# Patient Record
Sex: FEMALE | Race: WHITE | NOT HISPANIC OR LATINO | Employment: FULL TIME | URBAN - METROPOLITAN AREA
[De-identification: names, ages, dates, MRNs, and addresses within clinical notes are randomized per-mention and may not be internally consistent; named-entity substitution may affect disease eponyms.]

---

## 2019-04-30 ENCOUNTER — TRANSFERRED RECORDS (OUTPATIENT)
Dept: HEALTH INFORMATION MANAGEMENT | Facility: CLINIC | Age: 34
End: 2019-04-30

## 2019-04-30 LAB
HPV ABSTRACT: NORMAL
PAP-ABSTRACT: NORMAL

## 2021-08-10 ENCOUNTER — TRANSFERRED RECORDS (OUTPATIENT)
Dept: HEALTH INFORMATION MANAGEMENT | Facility: CLINIC | Age: 36
End: 2021-08-10
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
## 2021-08-17 NOTE — PATIENT INSTRUCTIONS
Please make appointment to see the dentist.     You can call imaging scheduling to set up appointment date, time, and location that works best for you to have ultrasound of your thyroid. [phone]         Preventive Health Recommendations  Female Ages 26 - 39  Yearly exam:   See your health care provider every year in order to    Review health changes.     Discuss preventive care.      Review your medicines if you your doctor has prescribed any.    Until age 30: Get a Pap test every three years (more often if you have had an abnormal result).    After age 30: Talk to your doctor about whether you should have a Pap test every 3 years or have a Pap test with HPV screening every 5 years.   You do not need a Pap test if your uterus was removed (hysterectomy) and you have not had cancer.  You should be tested each year for STDs (sexually transmitted diseases), if you're at risk.   Talk to your provider about how often to have your cholesterol checked.  If you are at risk for diabetes, you should have a diabetes test (fasting glucose).  Shots: Get a flu shot each year. Get a tetanus shot every 10 years.   Nutrition:     Eat at least 5 servings of fruits and vegetables each day.    Eat whole-grain bread, whole-wheat pasta and brown rice instead of white grains and rice.    Get adequate Calcium and Vitamin D.     Lifestyle    Exercise at least 150 minutes a week (30 minutes a day, 5 days of the week). This will help you control your weight and prevent disease.    Limit alcohol to one drink per day.    No smoking.     Wear sunscreen to prevent skin cancer.    See your dentist every six months for an exam and cleaning.

## 2021-08-18 ENCOUNTER — OFFICE VISIT (OUTPATIENT)
Dept: FAMILY MEDICINE | Facility: CLINIC | Age: 36
End: 2021-08-18
Payer: COMMERCIAL

## 2021-08-18 VITALS
TEMPERATURE: 98.6 F | WEIGHT: 133.2 LBS | DIASTOLIC BLOOD PRESSURE: 56 MMHG | HEIGHT: 62 IN | SYSTOLIC BLOOD PRESSURE: 94 MMHG | HEART RATE: 64 BPM | RESPIRATION RATE: 16 BRPM | BODY MASS INDEX: 24.51 KG/M2

## 2021-08-18 DIAGNOSIS — R06.83 SNORING: ICD-10-CM

## 2021-08-18 DIAGNOSIS — R53.83 FATIGUE, UNSPECIFIED TYPE: ICD-10-CM

## 2021-08-18 DIAGNOSIS — F41.1 GAD (GENERALIZED ANXIETY DISORDER): ICD-10-CM

## 2021-08-18 DIAGNOSIS — Z12.4 SCREENING FOR MALIGNANT NEOPLASM OF CERVIX: ICD-10-CM

## 2021-08-18 DIAGNOSIS — Z13.1 SCREENING FOR DIABETES MELLITUS: ICD-10-CM

## 2021-08-18 DIAGNOSIS — Z11.3 SCREEN FOR STD (SEXUALLY TRANSMITTED DISEASE): ICD-10-CM

## 2021-08-18 DIAGNOSIS — F33.2 SEVERE EPISODE OF RECURRENT MAJOR DEPRESSIVE DISORDER, WITHOUT PSYCHOTIC FEATURES (H): ICD-10-CM

## 2021-08-18 DIAGNOSIS — E04.1 THYROID NODULE: ICD-10-CM

## 2021-08-18 DIAGNOSIS — Z13.220 SCREENING FOR LIPOID DISORDERS: ICD-10-CM

## 2021-08-18 DIAGNOSIS — F42.9 OBSESSIVE-COMPULSIVE DISORDER, UNSPECIFIED TYPE: ICD-10-CM

## 2021-08-18 DIAGNOSIS — Z00.00 ROUTINE GENERAL MEDICAL EXAMINATION AT A HEALTH CARE FACILITY: Primary | ICD-10-CM

## 2021-08-18 LAB
ALBUMIN SERPL-MCNC: 4.1 G/DL (ref 3.4–5)
ALP SERPL-CCNC: 43 U/L (ref 40–150)
ALT SERPL W P-5'-P-CCNC: 23 U/L (ref 0–50)
ANION GAP SERPL CALCULATED.3IONS-SCNC: 2 MMOL/L (ref 3–14)
AST SERPL W P-5'-P-CCNC: 11 U/L (ref 0–45)
BASOPHILS # BLD AUTO: 0 10E3/UL (ref 0–0.2)
BASOPHILS NFR BLD AUTO: 1 %
BILIRUB SERPL-MCNC: 0.5 MG/DL (ref 0.2–1.3)
BUN SERPL-MCNC: 11 MG/DL (ref 7–30)
CALCIUM SERPL-MCNC: 9 MG/DL (ref 8.5–10.1)
CHLORIDE BLD-SCNC: 105 MMOL/L (ref 94–109)
CHOLEST SERPL-MCNC: 231 MG/DL
CLUE CELLS: ABNORMAL
CO2 SERPL-SCNC: 30 MMOL/L (ref 20–32)
CREAT SERPL-MCNC: 0.77 MG/DL (ref 0.52–1.04)
DEPRECATED CALCIDIOL+CALCIFEROL SERPL-MC: 19 UG/L (ref 20–75)
EOSINOPHIL # BLD AUTO: 0 10E3/UL (ref 0–0.7)
EOSINOPHIL NFR BLD AUTO: 1 %
ERYTHROCYTE [DISTWIDTH] IN BLOOD BY AUTOMATED COUNT: 12.5 % (ref 10–15)
FASTING STATUS PATIENT QL REPORTED: YES
GFR SERPL CREATININE-BSD FRML MDRD: >90 ML/MIN/1.73M2
GLUCOSE BLD-MCNC: 85 MG/DL (ref 70–99)
HCT VFR BLD AUTO: 44.3 % (ref 35–47)
HDLC SERPL-MCNC: 52 MG/DL
HGB BLD-MCNC: 14.7 G/DL (ref 11.7–15.7)
LDLC SERPL CALC-MCNC: 151 MG/DL
LYMPHOCYTES # BLD AUTO: 1.2 10E3/UL (ref 0.8–5.3)
LYMPHOCYTES NFR BLD AUTO: 20 %
MCH RBC QN AUTO: 30.4 PG (ref 26.5–33)
MCHC RBC AUTO-ENTMCNC: 33.2 G/DL (ref 31.5–36.5)
MCV RBC AUTO: 92 FL (ref 78–100)
MONOCYTES # BLD AUTO: 0.4 10E3/UL (ref 0–1.3)
MONOCYTES NFR BLD AUTO: 7 %
NEUTROPHILS # BLD AUTO: 4.2 10E3/UL (ref 1.6–8.3)
NEUTROPHILS NFR BLD AUTO: 72 %
NONHDLC SERPL-MCNC: 179 MG/DL
PLATELET # BLD AUTO: 228 10E3/UL (ref 150–450)
POTASSIUM BLD-SCNC: 3.9 MMOL/L (ref 3.4–5.3)
PROT SERPL-MCNC: 7.5 G/DL (ref 6.8–8.8)
RBC # BLD AUTO: 4.84 10E6/UL (ref 3.8–5.2)
SODIUM SERPL-SCNC: 137 MMOL/L (ref 133–144)
TRICHOMONAS, WET PREP: ABNORMAL
TRIGL SERPL-MCNC: 142 MG/DL
TSH SERPL DL<=0.005 MIU/L-ACNC: 1.11 MU/L (ref 0.4–4)
WBC # BLD AUTO: 5.9 10E3/UL (ref 4–11)
WBC'S/HIGH POWER FIELD, WET PREP: ABNORMAL
YEAST, WET PREP: ABNORMAL

## 2021-08-18 PROCEDURE — 99214 OFFICE O/P EST MOD 30 MIN: CPT | Mod: 25 | Performed by: NURSE PRACTITIONER

## 2021-08-18 PROCEDURE — 99385 PREV VISIT NEW AGE 18-39: CPT | Performed by: NURSE PRACTITIONER

## 2021-08-18 PROCEDURE — G0124 SCREEN C/V THIN LAYER BY MD: HCPCS | Performed by: PATHOLOGY

## 2021-08-18 PROCEDURE — G0145 SCR C/V CYTO,THINLAYER,RESCR: HCPCS | Performed by: NURSE PRACTITIONER

## 2021-08-18 PROCEDURE — 36415 COLL VENOUS BLD VENIPUNCTURE: CPT | Performed by: NURSE PRACTITIONER

## 2021-08-18 PROCEDURE — 82306 VITAMIN D 25 HYDROXY: CPT | Performed by: NURSE PRACTITIONER

## 2021-08-18 PROCEDURE — 87624 HPV HI-RISK TYP POOLED RSLT: CPT | Performed by: NURSE PRACTITIONER

## 2021-08-18 PROCEDURE — 96127 BRIEF EMOTIONAL/BEHAV ASSMT: CPT | Performed by: NURSE PRACTITIONER

## 2021-08-18 PROCEDURE — 87210 SMEAR WET MOUNT SALINE/INK: CPT | Performed by: NURSE PRACTITIONER

## 2021-08-18 PROCEDURE — 87591 N.GONORRHOEAE DNA AMP PROB: CPT | Performed by: NURSE PRACTITIONER

## 2021-08-18 PROCEDURE — 80061 LIPID PANEL: CPT | Performed by: NURSE PRACTITIONER

## 2021-08-18 PROCEDURE — 80050 GENERAL HEALTH PANEL: CPT | Performed by: NURSE PRACTITIONER

## 2021-08-18 RX ORDER — CITALOPRAM HYDROBROMIDE 20 MG/1
20 TABLET ORAL DAILY
COMMUNITY
End: 2021-08-18

## 2021-08-18 ASSESSMENT — ENCOUNTER SYMPTOMS
COUGH: 0
DIZZINESS: 0
DIARRHEA: 0
PALPITATIONS: 0
NAUSEA: 0
WHEEZING: 0
LIGHT-HEADEDNESS: 0
NUMBNESS: 0
SHORTNESS OF BREATH: 0
CONSTIPATION: 0
FATIGUE: 1
SORE THROAT: 0
DIFFICULTY URINATING: 0
RHINORRHEA: 0
SLEEP DISTURBANCE: 0
CHEST TIGHTNESS: 0
HEADACHES: 0
NERVOUS/ANXIOUS: 1
POLYDIPSIA: 0
ARTHRALGIAS: 0
ABDOMINAL PAIN: 0
FREQUENCY: 0
ABDOMINAL DISTENTION: 0
DYSPHORIC MOOD: 1
ACTIVITY CHANGE: 0
POLYPHAGIA: 0
VOMITING: 0

## 2021-08-18 ASSESSMENT — PATIENT HEALTH QUESTIONNAIRE - PHQ9
SUM OF ALL RESPONSES TO PHQ QUESTIONS 1-9: 13
5. POOR APPETITE OR OVEREATING: SEVERAL DAYS

## 2021-08-18 ASSESSMENT — ANXIETY QUESTIONNAIRES
2. NOT BEING ABLE TO STOP OR CONTROL WORRYING: SEVERAL DAYS
3. WORRYING TOO MUCH ABOUT DIFFERENT THINGS: MORE THAN HALF THE DAYS
5. BEING SO RESTLESS THAT IT IS HARD TO SIT STILL: SEVERAL DAYS
1. FEELING NERVOUS, ANXIOUS, OR ON EDGE: MORE THAN HALF THE DAYS
IF YOU CHECKED OFF ANY PROBLEMS ON THIS QUESTIONNAIRE, HOW DIFFICULT HAVE THESE PROBLEMS MADE IT FOR YOU TO DO YOUR WORK, TAKE CARE OF THINGS AT HOME, OR GET ALONG WITH OTHER PEOPLE: SOMEWHAT DIFFICULT
7. FEELING AFRAID AS IF SOMETHING AWFUL MIGHT HAPPEN: NOT AT ALL
GAD7 TOTAL SCORE: 10
6. BECOMING EASILY ANNOYED OR IRRITABLE: NEARLY EVERY DAY

## 2021-08-18 ASSESSMENT — MIFFLIN-ST. JEOR: SCORE: 1237.44

## 2021-08-18 ASSESSMENT — PAIN SCALES - GENERAL: PAINLEVEL: NO PAIN (0)

## 2021-08-18 NOTE — LETTER
My Depression Action Plan  Name: Leticia Lawson   Date of Birth 1985  Date: 8/18/2021    My doctor: No Ref-Primary, Physician   My clinic: [facility] [place]  [id number]  [place] [id number] [phone]          GREEN    ZONE   Good Control    What it looks like:     Things are going generally well. You have normal up s and down s. You may even feel depressed from time to time, but bad moods usually last less than a day.   What you need to do:  1. Continue to care for yourself (see self care plan)  2. Check your depression survival kit and update it as needed  3. Follow your physician s recommendations including any medication.  4. Do not stop taking medication unless you consult with your physician first.           YELLOW         ZONE Getting Worse    What it looks like:     Depression is starting to interfere with your life.     It may be hard to get out of bed; you may be starting to isolate yourself from others.    Symptoms of depression are starting to last most all day and this has happened for several days.     You may have suicidal thoughts but they are not constant.   What you need to do:     1. Call your care team, your response to treatment will improve if you keep your care team informed of your progress. Yellow periods are signs an adjustment may need to be made.     2. Continue your self-care, even if you have to fake it!    3. Talk to someone in your support network    4. Open up your depression survival kit           RED    ZONE Medical Alert - Get Help    What it looks like:     Depression is seriously interfering with your life.     You may experience these or other symptoms: You can t get out of bed most days, can t work or engage in other necessary activities, you have trouble taking care of basic hygiene, or basic responsibilities, thoughts of suicide or death that will not go away, self-injurious behavior.     What you need to do:  1. Call your care team  and request a same-day appointment. If they are not available (weekends or after hours) call your local crisis line, emergency room or 911.        Depression Self Care Plan / Survival Kit    Self-Care for Depression  Here s the deal. Your body and mind are really not as separate as most people think.  What you do and think affects how you feel and how you feel influences what you do and think. This means if you do things that people who feel good do, it will help you feel better.  Sometimes this is all it takes.  There is also a place for medication and therapy depending on how severe your depression is, so be sure to consult with your medical provider and/ or Behavioral Health Consultant if your symptoms are worsening or not improving.     In order to better manage my stress, I will:    Exercise  Get some form of exercise, every day. This will help reduce pain and release endorphins, the  feel good  chemicals in your brain. This is almost as good as taking antidepressants!  This is not the same as joining a gym and then never going! (they count on that by the way ) It can be as simple as just going for a walk or doing some gardening, anything that will get you moving.      Hygiene   Maintain good hygiene (Get out of bed in the morning, Make your bed, Brush your teeth, Take a shower, and Get dressed like you were going to work, even if you are unemployed).  If your clothes don't fit try to get ones that do.    Diet  I will strive to eat foods that are good for me, drink plenty of water, and avoid excessive sugar, caffeine, alcohol, and other mood-altering substances.  Some foods that are helpful in depression are: complex carbohydrates, B vitamins, flaxseed, fish or fish oil, fresh fruits and vegetables.    Psychotherapy  I agree to participate in Individual Therapy (if recommended).    Medication  If prescribed medications, I agree to take them.  Missing doses can result in serious side effects.  I understand that  drinking alcohol, or other illicit drug use, may cause potential side effects.  I will not stop my medication abruptly without first discussing it with my provider.    Staying Connected With Others  I will stay in touch with my friends, family members, and my primary care provider/team.    Use your imagination  Be creative.  We all have a creative side; it doesn t matter if it s oil painting, sand castles, or mud pies! This will also kick up the endorphins.    Witness Beauty  (AKA stop and smell the roses) Take a look outside, even in mid-winter. Notice colors, textures. Watch the squirrels and birds.     Service to others  Be of service to others.  There is always someone else in need.  By helping others we can  get out of ourselves  and remember the really important things.  This also provides opportunities for practicing all the other parts of the program.    Humor  Laugh and be silly!  Adjust your TV habits for less news and crime-drama and more comedy.    Control your stress  Try breathing deep, massage therapy, biofeedback, and meditation. Find time to relax each day.     My support system    Clinic Contact:  Phone number:    Contact 1:  Phone number:    Contact 2:  Phone number:    Mandaeism/:  Phone number:    Therapist:  Phone number:    Local crisis center:    Phone number:    Other community support:  Phone number:

## 2021-08-18 NOTE — Clinical Note
Please abstract the following data from this visit with this patient into the appropriate field in Epic:    Tests that can be patient reported without a hard copy:    Pap smear done on this date: 4/30/2019 (approximately), by this group: [facility], results were NIL.  and HPV done 4/30/2019-result negative.    Other Tests found in the patient's chart through Chart Review/Care Everywhere:    {Abstract Quality List (Optional):735916}    Note to Abstraction: If this section is blank, no results were found via Chart Review/Care Everywhere.

## 2021-08-18 NOTE — RESULT ENCOUNTER NOTE
Leticia,     You have Vitamin D deficiency. You need to start taking Vitamin D3 4000 units/100 mcg orally daily and plan to recheck your Vitamin D level again in 3 months.     Liya MURPHY

## 2021-08-19 LAB — N GONORRHOEA DNA SPEC QL NAA+PROBE: NEGATIVE

## 2021-08-19 ASSESSMENT — ANXIETY QUESTIONNAIRES: GAD7 TOTAL SCORE: 10

## 2021-08-24 LAB
BKR LAB AP GYN ADEQUACY: ABNORMAL
BKR LAB AP GYN INTERPRETATION: ABNORMAL
BKR LAB AP HPV REFLEX: ABNORMAL
BKR LAB AP LMP: ABNORMAL
BKR LAB AP PREVIOUS ABNORMAL: ABNORMAL
PATH REPORT.COMMENTS IMP SPEC: ABNORMAL
PATH REPORT.RELEVANT HX SPEC: ABNORMAL

## 2021-08-26 ENCOUNTER — ANCILLARY PROCEDURE (OUTPATIENT)
Dept: ULTRASOUND IMAGING | Facility: CLINIC | Age: 36
End: 2021-08-26
Attending: NURSE PRACTITIONER
Payer: COMMERCIAL

## 2021-08-26 DIAGNOSIS — E04.1 THYROID NODULE: ICD-10-CM

## 2021-08-26 PROCEDURE — 76536 US EXAM OF HEAD AND NECK: CPT | Performed by: RADIOLOGY

## 2021-08-27 ENCOUNTER — E-CONSULT (OUTPATIENT)
Dept: ENDOCRINOLOGY | Facility: CLINIC | Age: 36
End: 2021-08-27
Payer: COMMERCIAL

## 2021-08-27 DIAGNOSIS — R93.89 ABNORMAL THYROID ULTRASOUND: Primary | ICD-10-CM

## 2021-08-27 PROBLEM — R87.610 ASCUS OF CERVIX WITH NEGATIVE HIGH RISK HPV: Status: ACTIVE | Noted: 2021-08-27

## 2021-08-27 LAB
HUMAN PAPILLOMA VIRUS 16 DNA: NEGATIVE
HUMAN PAPILLOMA VIRUS 18 DNA: NEGATIVE
HUMAN PAPILLOMA VIRUS FINAL DIAGNOSIS: NORMAL
HUMAN PAPILLOMA VIRUS OTHER HR: NEGATIVE

## 2021-08-27 PROCEDURE — 99207 E-CONSULT TO ENDOCRINOLOGY (ADULT OUTPT PROVIDER TO SPECIALIST WRITTEN QUESTION & RESPONSE): CPT | Performed by: NURSE PRACTITIONER

## 2021-08-27 PROCEDURE — 99451 NTRPROF PH1/NTRNET/EHR 5/>: CPT

## 2021-08-28 NOTE — PROGRESS NOTES
ALL SMARTFIELDS MUST BE COMPLETED FOR PATIENT CARE AND BILLING    8/27/2021     E-Consult has been accepted.    Interprofessional consultation requested by:  Liya Chaudhry APRN CNP      Clinical Question/Purpose: MY CLINICAL QUESTION IS: Previous CT that showed thyroid nodules, US-no thyroid nodules but possible thyroiditis. Normal TSH     Patient assessment and information reviewed:   Images as read by me:  There are no CT images on PACs.  There are no reports of CT images in our system.    There are no reports of outside CT images on the media tab  Care everywhere is not open.    8/26/2021 thyroid US: the study does not include cine. The thyroid images shown do not show any thyroid nodules. The echogenicity is mostly homogeneous with very slight heterogeneity.   Right lobe 1.7 x 1.4 x 5.6 cm  Left lobe 1.7 x 1.4 x 5 cm   isthmus 0.5 cm   Thyroid measurements are normal.      Labs:  8/18/2021 TSH 1.11    Assessment:   Normal thyroid US    Recommendations:   Provide proof of the abnormal CT you cite if you wish to pursue this further.      The recommendations provided in this E-Consult are based on the clinical data available to me at this time, and are furnished without the benefit of a comprehensive in-person or virtual patient evaluation, Any new clinical issues or changes in patient status since the filing of this E-Consult will need to be taken into account when assessing these recommendations. Please contact me if you have further questions.    My total time spent reviewing clinical information and formulating assessment was 10 minutes.    Report sent automatically to requesting provider once signed.     Brie Protcor MD

## 2021-08-30 ENCOUNTER — MYC MEDICAL ADVICE (OUTPATIENT)
Dept: FAMILY MEDICINE | Facility: CLINIC | Age: 36
End: 2021-08-30

## 2021-10-12 ENCOUNTER — MYC MEDICAL ADVICE (OUTPATIENT)
Dept: PSYCHIATRY | Facility: CLINIC | Age: 36
End: 2021-10-12

## 2021-10-17 ENCOUNTER — HEALTH MAINTENANCE LETTER (OUTPATIENT)
Age: 36
End: 2021-10-17

## 2021-10-21 PROBLEM — F42.9 OBSESSIVE-COMPULSIVE DISORDER, UNSPECIFIED TYPE: Chronic | Status: ACTIVE | Noted: 2021-08-18

## 2021-10-21 PROBLEM — F41.1 GAD (GENERALIZED ANXIETY DISORDER): Chronic | Status: ACTIVE | Noted: 2021-08-18

## 2021-10-22 VITALS — BODY MASS INDEX: 24.48 KG/M2 | WEIGHT: 133 LBS | HEIGHT: 62 IN

## 2021-10-22 RX ORDER — NICOTINE POLACRILEX 4 MG/1
20 GUM, CHEWING ORAL DAILY
COMMUNITY
End: 2022-06-16

## 2021-10-22 ASSESSMENT — MIFFLIN-ST. JEOR: SCORE: 1236.53

## 2021-10-22 NOTE — PROGRESS NOTES
Leticia Lawson is a 36 year old who is being evaluated via a billable video visit.      How would you like to obtain your AVS? MyChart  If the video visit is dropped, the invitation should be resent by: Text to cell phone: [phone]  Will anyone else be joining your video visit? No    Does patient have any form of state insurance?No   Do you have wifi? Yes  Do you have a smart phone/device?Yes  Can you download an khang on your phone comfortably with out assistance including You Tube? Yes    If patient encounters technical issues they should call [phone] :[phone]}    Prasad Herrera MA    Video-Visit Details    Video Start Time: 9:32 AM    Type of service:  Video Visit    Video End Time:9:56 AM    Originating Location (pt. Location): Home    Distant Location (provider location):   [facility]     Platform used for Video Visit: "[employer]       Sleep Consultation:    Date on this visit: 10/25/2021    Leticia Lawson is sent by Liya Chaudhry for a sleep consultation regarding snoring and fatigue.    Primary Physician: No Ref-Primary, Physician     Chief Complaint   Patient presents with     Consult     Discuss snoring and fatigue       Leticia Lawson is a 36 year old female with history remarkable for depression and TORRI.     Leticia goes to sleep between 10 PM and 1:00 AM. She wakes up at 8:00 AM with an alarm. She falls asleep in 5 minutes.  Leticia denies difficulty falling asleep.  She wakes up 1-2 times a night for 5 minutes before falling back to sleep.  Leticia wakes up to uncertain reasons and external stimuli.  Patient gets an average of 8-10 hours of sleep per night. She does not feel refreshed.     Patient does not watch TV in bed.     Leticia does not do shift work.  She works day shifts.      Leticia does snore every night and snoring is loud. Patient does have a regular bed partner. There is report of gasping.  She does have witnessed apneas. They never sleep  "separately.  Patient sleeps on her side. She has occasional morning headaches and no morning confusion and restless legs. Leitcia denies any bruxism, sleep walking, sleep talking, dream enactment, sleep paralysis and hypnogogic/hypnopompic hallucinations. She reports weakness in the legs with negative emotion (anger). The weakness however last a long time.     Leticia denies difficulty breathing through her nose and claustrophobia.      Leticia has gained 0-5 pounds in the last year.  Patient describes themself as a night person.  She would prefer to go to sleep at 2:00 AM and wake up at 12:00 PM.  Patient's [place] Sleepiness score 11/24 consistent with mild daytime sleepiness.      Leticia naps 0 times per week. She takes some inadvertant naps but only in the evenings.  She denies falling asleep while driving. Patient was counseled on the importance of driving while alert, to pull over if drowsy, or nap before getting into the vehicle if sleepy.  She uses 1-2 cups/day of coffee. Last caffeine intake is usually before noon.    Allergies:    Allergies   Allergen Reactions     Sulfa Drugs        Medications:    Current Outpatient Medications   Medication Sig Dispense Refill     Cyanocobalamin 5000 MCG TBDP        omeprazole 20 MG tablet Take 20 mg by mouth daily       sertraline (ZOLOFT) 50 MG tablet Take 1 tablet (50 mg) by mouth daily 90 tablet 1     vitamin D3 (CHOLECALCIFEROL) 250 mcg (95724 units) capsule Take 1 capsule by mouth daily         Problem List:  Patient Active Problem List    Diagnosis Date Noted     ASCUS of cervix with negative high risk HPV 08/27/2021     Priority: Medium     \"Did have abnormal in 20s. Had colposcopy that was normal. Then had 2 more paps that were abnormal, since those have all been normal since.\" 2021 2019 NIL Pap, Neg HPV  8/18/21 ASCUS pap, Neg HPV. Plan cotest in 3 years.       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, without psychotic features (H) 08/18/2021     Priority: Medium     TORRI (generalized anxiety disorder) 08/18/2021     Priority: Medium     Thyroid nodule 08/18/2021     Priority: Medium        Past Medical/Surgical History:  Past Medical History:   Diagnosis Date     Abnormal Pap smear of cervix 08/18/2021     Past Surgical History:   Procedure Laterality Date     D & C  2010     EGD       TUBAL LIGATION  04/19/2019       Social History:  Social History     Socioeconomic History     Marital status: Legally      Spouse name: Not on file     Number of children: Not on file     Years of education: Not on file     Highest education level: Not on file   Occupational History     Not on file   Tobacco Use     Smoking status: Former Smoker     Smokeless tobacco: Never Used   Vaping Use     Vaping Use: Every day     Substances: Nicotine   Substance and Sexual Activity     Alcohol use: Yes     Drug use: Never     Sexual activity: Yes     Partners: Male     Birth control/protection: Female Surgical   Other Topics Concern     Not on file   Social History Narrative     Not on file     Social Determinants of Health     Financial Resource Strain:      Difficulty of Paying Living Expenses:    Food Insecurity:      Worried About Running Out of Food in the Last Year:      Ran Out of Food in the Last Year:    Transportation Needs:      Lack of Transportation (Medical):      Lack of Transportation (Non-Medical):    Physical Activity:      Days of Exercise per Week:      Minutes of Exercise per Session:    Stress:      Feeling of Stress :    Social Connections:      Frequency of Communication with Friends and Family:      Frequency of Social Gatherings with Friends and Family:      Attends Holiness Services:      Active Member of Clubs or Organizations:      Attends Club or Organization Meetings:      Marital Status:    Intimate Partner Violence:      Fear of Current or Ex-Partner:       "Emotionally Abused:      Physically Abused:      Sexually Abused:        Family History:  Family History   Problem Relation Age of Onset     Ovarian Cancer Mother      Alzheimer Disease Maternal Grandmother      Lung Cancer Maternal Grandfather      Breast Cancer Paternal Grandmother      Bone Cancer Paternal Grandmother      Emphysema Paternal Grandfather      Bipolar Disorder Sister        Review of Systems:  A complete review of systems reviewed by me is negative with the exeption of what has been mentioned in the history of present illness.  CONSTITUTIONAL: NEGATIVE for weight gain/loss, fever, chills, sweats or night sweats, drug allergies.  EYES: NEGATIVE for changes in vision, blind spots, double vision.  ENT: NEGATIVE for ear pain, sore throat, sinus pain, post-nasal drip, runny nose, bloody nose  CARDIAC: NEGATIVE for fast heartbeats or fluttering in chest, chest pain or pressure, breathlessness when lying flat, swollen legs or swollen feet.  NEUROLOGIC: NEGATIVE headaches, weakness or numbness in the arms or legs.  DERMATOLOGIC: NEGATIVE for rashes, new moles or change in mole(s)  PULMONARY: NEGATIVE SOB at rest, SOB with activity, dry cough, productive cough, coughing up blood, wheezing or whistling when breathing.    GASTROINTESTINAL: NEGATIVE for nausea or vomitting, loose or watery stools, fat or grease in stools, constipation, abdominal pain, bowel movements black in color or blood noted.  GENITOURINARY: NEGATIVE for pain during urination, blood in urine, urinating more frequently than usual, irregular menstrual periods.  MUSCULOSKELETAL: NEGATIVE for muscle pain, bone or joint pain, swollen joints.  ENDOCRINE: NEGATIVE for increased thirst or urination, diabetes.  LYMPHATIC: NEGATIVE for swollen lymph nodes, lumps or bumps in the breasts or nipple discharge.    Physical Examination:  Vitals: Ht 1.575 m (5' 2\")   Wt 60.3 kg (133 lb)   BMI 24.33 kg/m    BMI= Body mass index is 24.33 kg/m .     "     [place] Total Score 10/22/2021   Total score - [place] 11       PRICILA Total Score: 15 (10/22/21 1000)    GENERAL APPEARANCE: alert and no distress  EYES: Eyes grossly normal to inspection  RESP: breathing is non-labored  NEURO: mentation intact and speech normal  PSYCH: affect normal/bright      Last Comprehensive Metabolic Panel:  Sodium   Date Value Ref Range Status   08/18/2021 137 133 - 144 mmol/L Final     Potassium   Date Value Ref Range Status   08/18/2021 3.9 3.4 - 5.3 mmol/L Final     Chloride   Date Value Ref Range Status   08/18/2021 105 94 - 109 mmol/L Final     Carbon Dioxide (CO2)   Date Value Ref Range Status   08/18/2021 30 20 - 32 mmol/L Final     Anion Gap   Date Value Ref Range Status   08/18/2021 2 (L) 3 - 14 mmol/L Final     Glucose   Date Value Ref Range Status   08/18/2021 85 70 - 99 mg/dL Final     Urea Nitrogen   Date Value Ref Range Status   08/18/2021 11 7 - 30 mg/dL Final     Creatinine   Date Value Ref Range Status   08/18/2021 0.77 0.52 - 1.04 mg/dL Final     GFR Estimate   Date Value Ref Range Status   08/18/2021 >90 >60 mL/min/1.73m2 Final     Comment:     As of July 11, 2021, eGFR is calculated by the CKD-EPI creatinine equation, without race adjustment. eGFR can be influenced by muscle mass, exercise, and diet. The reported eGFR is an estimation only and is only applicable if the renal function is stable.     Calcium   Date Value Ref Range Status   08/18/2021 9.0 8.5 - 10.1 mg/dL Final     TSH   Date Value Ref Range Status   08/18/2021 1.11 0.40 - 4.00 mU/L Final     TSH   Date Value Ref Range Status   08/18/2021 1.11 0.40 - 4.00 mU/L Final         Impression:  Patient has features and risk factors for possible obstructive sleep apnea including: loud snoring, elevated bicarbonate of 30, witnessed apnea, non-refreshing sleep, daytime sleepiness (ESS 11) and difficulty maintaining sleep. The STOP-BANG score is 3/8. The pathophysiology, diagnosis and treatment of TOM was discussed.    Plan:    1. Polysomnogram (using 4% desaturation/Medicare/ AASM 1B scoring rules) for intermediate risk obstructive sleep apnea.   2. Advised her against drowsy driving.      Literature provided regarding sleep apnea.      She will follow up with me in approximately two weeks after her sleep study has been competed to review the results and discuss plan of care.       Polysomnography reviewed.  Obstructive sleep apnea reviewed.  Complications of untreated sleep apnea were reviewed.    Dmitri Vásquez PA-C    CC: Liya Chaudhry

## 2021-10-22 NOTE — PATIENT INSTRUCTIONS
Your BMI is Body mass index is 24.33 kg/m .  Weight management is a personal decision.  If you are interested in exploring weight loss strategies, the following discussion covers the approaches that may be successful. Body mass index (BMI) is one way to tell whether you are at a healthy weight, overweight, or obese. It measures your weight in relation to your height.  A BMI of 18.5 to 24.9 is in the healthy range. A person with a BMI of 25 to 29.9 is considered overweight, and someone with a BMI of 30 or greater is considered obese. More than two-thirds of American adults are considered overweight or obese.  Being overweight or obese increases the risk for further weight gain. Excess weight may lead to heart disease and diabetes.  Creating and following plans for healthy eating and physical activity may help you improve your health.  Weight control is part of healthy lifestyle and includes exercise, emotional health, and healthy eating habits. Careful eating habits lifelong are the mainstay of weight control. Though there are significant health benefits from weight loss, long-term weight loss with diet alone may be very difficult to achieve- studies show long-term success with dietary management in less than 10% of people. Attaining a healthy weight may be especially difficult to achieve in those with severe obesity. In some cases, medications, devices and surgical management might be considered.  What can you do?  If you are overweight or obese and are interested in methods for weight loss, you should discuss this with your provider.     Consider reducing daily calorie intake by 500 calories.     Keep a food journal.     Avoiding skipping meals, consider cutting portions instead.    Diet combined with exercise helps maintain muscle while optimizing fat loss. Strength training is particularly important for building and maintaining muscle mass. Exercise helps reduce stress, increase energy, and improves fitness.  Increasing exercise without diet control, however, may not burn enough calories to loose weight.       Start walking three days a week 10-20 minutes at a time    Work towards walking thirty minutes five days a week     Eventually, increase the speed of your walking for 1-2 minutes at time    In addition, we recommend that you review healthy lifestyles and methods for weight loss available through the National Institutes of Health patient information sites:  http://win.niddk.nih.gov/publications/index.htm    And look into health and wellness programs that may be available through your health insurance provider, employer, local community center, or guille club.    Weight management plan: Patient was referred to their PCP to discuss a diet and exercise plan.    MY CONTACT NUMBERS ARE: [phone]  DOCTOR : ADRIAN Perkins  SLEEP CENTER :   CPAP EQUIPMENT :    IF I HAVE SLEEP APNEA.....  WHERE CAN I FIND MORE INFORMATION?    American Academy of Sleep Medicine Patient information on sleep disorders:  http://yoursleep.aasmnet.org    THINGS TO REMEMBER  In most situations, sleep apnea is a lifelong disease that must be managed with daily therapy. Untreated disease, when severe, may result in an increased risk for an array of problems from heart disease to mood changes, car accidents and shorter lifespan.    CPAP -  WHY AND HOW?  Continuous positive airway pressure, or CPAP, is the most effective treatment for obstructive sleep apnea. A decision to use CPAP is a major step forward in the pursuit of a healthier life. The successful use of CPAP will help you breathe easier, sleep better and live healthier. Using CPAP can be a positive experience if you keep these jay points in mind:  1. Commitment  CPAP is not a quick fix for your problem. It involves a long-term commitment to improve your sleep and your health.    2. Communication  Stay in close communication with both your sleep doctor and your CPAP supplier. Ask lots of  "questions and seek help when you need it.    3. Consistency  Use CPAP all night, every night and for every nap. You will receive the maximum health benefits from CPAP when you use it every time that you sleep. This will also make it easier for your body to adjust to the treatment.    4. Correction  The first machine and mask that you try may not be the best ones for you. Work with your sleep doctor and your CPAP supplier to make corrections to your equipment selection. Ask about trying a different type of machine or mask if you have ongoing problems. Make sure that your mask is a good fit and learn to use your equipment properly.    5. Challenge  Tell a family member or close friend to ask you each morning if you used your CPAP the previous night. Have someone to challenge you to give it your best effort.    6. Connection   Your adjustment to CPAP will be easier if you are able to connect with others who use the same treatment. Ask your sleep doctor if there is a support group in your area for people who have sleep apnea, or look for one on the Internet.    7. Comfort   Increase your level of comfort by using a saline spray, decongestant or heated humidifier if CPAP irritates your nose, mouth or throat. Use your unit's \"ramp\" setting to slowly get used to the air pressure level. There may be soft pads you can buy that will fit over your mask straps. Look on www.CPAP.com for accessories such as these straps, a pillow contoured for side-sleeping with CPAP, longer hoses, hose covers to reduce condensation, or stands to keep the hose out of your way.                                 8. Cleaning   Clean your mask, tubing and headgear on a regular basis. Put this time in your schedule so that you don't forget to do it. Check and replace the filters for your CPAP unit and humidifier.    9. Completion   Although you are never finished with CPAP therapy, you should reward yourself by celebrating the completion of your first " month of treatment. Expect this first month to be your hardest period of adjustment. It will involve some trial and error as you find the machine, mask and pressure settings that are right for you.    Continuation  After your first month of treatment, continue to make a daily commitment to use your CPAP all night, every night and for every nap.    CPAP-Tips to starting with success:  Begin using your CPAP for short periods of time during the day while you watch TV or read.    Use CPAP every night and for every nap. Using it less often reduces the health benefits and makes it harder for your body to get used to it.    Newer CPAP models are virtually silent; however, if you find the sound of your CPAP machine to be bothersome, place the unit under your bed to dampen the sound.     Make small adjustments to your mask, tubing, straps and headgear until you get the right fit. Tightening the mask may actually worsen the leak.  If it leaves significant marks on your face or irritates the bridge of your nose, it may not be the best mask for you.  Speak with the person who supplied the mask and consider trying other masks.    Use a saline nasal spray to ease mild nasal congestion. Neti-Pot or saline nasal rinses may also help. Nasal gel sprays can help reduce nasal dryness.  Biotene mouthwash can be helpful to protect your teeth if you experience frequent dry mouth.  Dry mouth may be a sign of air escaping out of your mouth or out of the mask in the case of a full face mask.  Speak with your provider if you expect that is the case.     Take a nasal decongestant to relieve more severe nasal or sinus congestion.  Do not use Afrin (oxymetazoline) nasal spray more than 3 days in a row.  Speak with your sleep doctor if your nasal congestion is chronic.    Use a heated humidifier that fits your CPAP model to enhance your breathing comfort. Adjust the heat setting up if you get a dry nose or throat, down if you get condensation in  the hose or mask.  Position the CPAP lower than you so that any condensation in the hose drains back into the machine rather than towards the mask.    Try a system that uses nasal pillows if traditional masks give you problems.    Clean your mask, tubing and headgear once a week. Make sure the equipment dries fully.    Regularly check and replace the filters for your CPAP unit and humidifier.    Work closely with your sleep doctor and your CPAP supplier to make sure that you have the machine, mask and air pressure setting that works best for you.    BESIDES CPAP, WHAT OTHER THERAPIES ARE THERE?    Postioning devices if you only have the problem on your back    Dental devices if your condition is mild    Nasal valves may be effective though experience is limited    Tongue Retaining Device if missing teeth precludes the use of a dental device    Weight loss if you are overweight    Surgery in limited cases where devices are not acceptable or there are problems with structures in the nose and throat  If treated with one of these alternative options, further evaluation is necessary to ensure that the therapy is effective. This may require some form of testing.     Healthy Lifestyle:  Healthy diet, exercise and Limit alcohol: Not only will excessive alcohol increase your weight over time, but it irritates the throat tissues and make them swell, shrinking the airway and causing snoring. Drinking alcohol should be limited and stopped within 3-4 hours before going to bed.   Stop smoking: (Red swollen throat, heat, nicotine), also irritates and swells the airway, among numerous other negative health consequences.    Positioning Device  This example shows a pillow that straps around the waist. It may be appropriate for those whose sleep study shows milder sleep apnea that occurs primarily when lying flat on one's back. Preliminary studies have shown benefit but effectiveness at home should be verified.    Nasal Valves               Nasal valves may not be effective if you have frequent nasal congestion or have difficulty breathing through your nose. They may be an option for mild apnea if other options are not well tolerated. The efficacy of these devices is generally less than CPAP or oral appliances.  Oral Appliance  These are examples of two of many custom-made devices that are more likely to work in mild sleep apnea  Oral appliances are dental mouth pieces that fit very much like a sports mouth guards or removable orthodontic retainers. They are used to treat snoring  And obstructive sleep apnea . The device prevents the airway from collapsing by either holding the tongue or supporting the jaw in a forward position. Since oral appliances are non-invasive and easy to use, they may be considered as an early treatment option. Oral appliance therapy (OAT) involves the customization, selection, fabrication, fitting, adjustments and long-term follow-up care of specially designed oral devices, worn during sleep, which reposition the lower jaw and tongue base forward to maintain an open airway.  Custom made oral appliances are proven to be more effective than over-the-counter devices. Therefore, the over-the-counter devices are recommended not to be used as a screening tool nor as a therapeutic option.  Who gets a dental device?  Oral appliance therapy can be used as an alternative to  CPAP therapy for the treatment of mild to moderate sleep apnea and for those patients who prefer OAT to CPAP. Oral appliance therapy is a first line therapy for the treatment of primary snoring. Additionally, OAT is an option for those that cannot tolerate CPAP as therapy or who have experienced insufficient surgical results.    Possible side effects?  Frequent but minor side effects include: excessive salivation, dry mouth, discomfort of teeth and jaw and temporary changes in the patient s bite.  Potential complications include: jaw pain, permanent occlusal  changes and TMJ symptoms.  The above mentioned side effects and complications can be recognized and managed by dentists trained in dental sleep medicine.    Finding a dentist that practices dental sleep medicine  Specific training is available through the American Academy of Dental Sleep Medicine for dentists interested in working in the field of sleep. To find a dentist who is educated in the field of sleep and the use of oral appliances, near you, visit the Web site of the American Academy of Dental Sleep Medicine; also see http://www.accpstorage.org/newOrganization/patients/oralAppliances.pdf   To search for a dentist certified in these practices:  Http://aadsm.org/FindADentist.aspx?1  Http://www.accpstorage.org/newOrganization/patients/oralAppliances.pdf    Tongue Retaining Device               Tongue Retaining Devices are devices that generally  suction cup  onto the tongue preventing it from falling back into the back of the throat during sleep.  They may be an option for people missing teeth, but can be uncomfortable. This particular device can be purchased online, but similar devices made by dentists fit more precisely and may be tolerated better. In general, they are rarely effective and often not very well tolerated.    Weight Loss:  Some patients may experience reduction or elimination of sleep apnea with weight loss.  Though there are significant health benefits from weight loss, long-term weight loss is very difficult to achieve- studies show success with dietary management in less that 10% of people.     If you are interested in dietary weight loss, you should review the options discussed at the National Institutes of Health patient information site:     Http:/www.health.nih.gov/topic/WeightLossDieting    Bariatric programs offer counseling in all methods of weight loss:    Http:/www.[facility].org/Specialties/WeightLossSurgeryandMedicalMgmt/htm    Surgery:  There are a number of surgeries that  "have been attempted to treat apnea. In general, surgical options are usually reserved for cases in which there is a physical abnormality contributing to obstruction or other treatment options are ineffective or not tolerated. Most surgical options are either unreliable or quite invasive. One of the more common procedures is:  Uvulopalatopharyngoplasty: In this procedure, the uvula (the finger-like tissue that hangs in the back of the throat), part of the soft palate (the tissue that the uvula is attached to), and sometimes the tonsils or adenoids are removed. The efficacy of this surgery is around 30-50% .  After surgery, complications may include:  Sleepiness and sleep apnea related to post-surgery medication   Swelling, infection and bleeding   A sore throat and/or difficulty swallowing   Drainage of secretions into the nose and a nasal quality to the voice. English language speech does not seem to be affected by this surgery.   Narrowing of the airway in the nose and throat (hence constricting breathing) snoring and even iatrogenically caused sleep apnea. By cutting the tissues, excess scar tissue can \"tighten\" the airway and make it even smaller than it was before UPPP.  Patients who have had the uvula removed will become unable to correctly speak Slovak or any other language that has a uvular 'r' phoneme.    Surgeries to help resolve nasal congestion may help reduce the severity of apnea slightly. Nasal congestion does not cause apnea on its own, so these surgeries are usually not performed just for TOM.  They may be worth considering if the nasal congestion is significantly bothersome independent of apnea.    "

## 2021-10-25 ENCOUNTER — VIRTUAL VISIT (OUTPATIENT)
Dept: SLEEP MEDICINE | Facility: CLINIC | Age: 36
End: 2021-10-25
Attending: NURSE PRACTITIONER
Payer: COMMERCIAL

## 2021-10-25 DIAGNOSIS — R40.0 DAYTIME SLEEPINESS: ICD-10-CM

## 2021-10-25 DIAGNOSIS — G47.30 SLEEP APNEA, UNSPECIFIED TYPE: ICD-10-CM

## 2021-10-25 DIAGNOSIS — R06.89 DYSPNEA AND RESPIRATORY ABNORMALITY: Primary | ICD-10-CM

## 2021-10-25 DIAGNOSIS — R06.00 DYSPNEA AND RESPIRATORY ABNORMALITY: Primary | ICD-10-CM

## 2021-10-25 DIAGNOSIS — R53.83 FATIGUE, UNSPECIFIED TYPE: ICD-10-CM

## 2021-10-25 DIAGNOSIS — R06.83 SNORING: ICD-10-CM

## 2021-10-25 DIAGNOSIS — Z72.820 LACK OF ADEQUATE SLEEP: ICD-10-CM

## 2021-10-25 PROBLEM — F33.2 SEVERE EPISODE OF RECURRENT MAJOR DEPRESSIVE DISORDER, WITHOUT PSYCHOTIC FEATURES (H): Chronic | Status: ACTIVE | Noted: 2021-08-18

## 2021-10-25 PROCEDURE — 99244 OFF/OP CNSLTJ NEW/EST MOD 40: CPT | Mod: GT | Performed by: PHYSICIAN ASSISTANT

## 2021-10-26 ENCOUNTER — MYC MEDICAL ADVICE (OUTPATIENT)
Dept: FAMILY MEDICINE | Facility: CLINIC | Age: 36
End: 2021-10-26

## 2021-11-02 NOTE — PROGRESS NOTES
Assessment & Plan     Dizziness  EKG unremarkable.  We will obtain MRI of brain  - EKG 12-lead complete w/read - Clinics  - MR Brain w/o & w Contrast; Future    Generalized muscle weakness  Will obtain MRI of brain.  Set up for physical therapy  - MR Brain w/o & w Contrast; Future  - LIT PT and Hand Referral; Future    Fatigue, unspecified type  Check labs here today.  - CBC with Platelets & Differential; Future  - Comprehensive metabolic panel; Future  - CMV antibody IgM; Future  - CMV Antibody IgG; Future  - EBV Capsid Antibody IgG; Future  - EBV Capsid Antibody IgM; Future  - Vitamin D Deficiency; Future  - TSH with free T4 reflex; Future  - Vitamin D Deficiency; Future  - Vitamin B12; Future  - CBC with Platelets & Differential  - Comprehensive metabolic panel  - CMV antibody IgM  - CMV Antibody IgG  - EBV Capsid Antibody IgG  - EBV Capsid Antibody IgM  - Vitamin D Deficiency  - TSH with free T4 reflex  - Vitamin B12    Myalgia  We will check labs here today.  Full plan will depend on outcome  Educated on use of Medrol.  - CRP inflammation; Future  - Erythrocyte sedimentation rate auto; Future  - Cyclic Citrullinated Peptide Antibody IgG; Future  - Rheumatoid factor; Future  - methylPREDNISolone (MEDROL DOSEPAK) 4 MG tablet therapy pack; Follow Package Directions  - CRP inflammation  - Erythrocyte sedimentation rate auto  - Cyclic Citrullinated Peptide Antibody IgG  - Rheumatoid factor    Multiple joint pain  We will check labs here today.  Full plan will depend on outcome  - CRP inflammation; Future  - Erythrocyte sedimentation rate auto; Future  - Anti Nuclear Sherry IgG by IFA with Reflex; Future  - Cyclic Citrullinated Peptide Antibody IgG; Future  - Rheumatoid factor; Future  - CRP inflammation  - Erythrocyte sedimentation rate auto  - Anti Nuclear Sherry IgG by IFA with Reflex  - Cyclic Citrullinated Peptide Antibody IgG  - Rheumatoid factor    Numbness and tingling  We will check labs here today.  Set up for  "MRI of brain  - Anti Nuclear Sherry IgG by IFA with Reflex; Future  - MR Brain w/o & w Contrast; Future  - Anti Nuclear Sherry IgG by IFA with Reflex    Encouraged to make appointment for eye exam.    Review of the result(s) of each unique test - Labs  Ordering of each unique test  Prescription drug management  26 minutes spent on the date of the encounter doing chart review, history and exam, documentation and further activities per the note       Return in about 1 week (around 11/10/2021), or if symptoms worsen or fail to improve.    IVANIA Dong CNP  [facility] QUEENIE Bentley is a 36 year old who presents for the following health issues     HPI     Dizziness  Onset/Duration: Had cold 4-6 weeks ago. Covid was negative. As cold symptoms went away dizziness, body pain and stiffness started. Majority of pain is in right leg.  Appetite is gone. Eyes feel dry and can't focus.   Description:   Do you feel faint: YES  Does it feel like the surroundings (bed, room) are moving: YES  Unsteady/off balance: YES  Have you passed out or fallen: YES- fell twice in the shower  Intensity: severe  Progression of Symptoms: worsening  Accompanying Signs & Symptoms:  Heart palpitations or chest pain: YES- occasional sharp chest pain lasting 10-30 minutes  Nausea, vomiting: YES- long history of nausea, unsure if current nausea is related  Weakness or lack of coordination in arms or legs: YES- very weak, loss of coordination in legs, no  strength, impossible to chop vegetables or open anything with hands  Vision or speech changes: YES- vision is \"irritating\" her, constantly straining eyes, eye drops have not helped  Numbness or tingling: YES- occasional tingling in toes and fingers  Ringing in ears (Tinnitus): no  Hearing Loss: no  History:   Head trauma/concussion history: no  Previous similar symptoms: YES- vertigo years ago that was height related  Recent bleeding history: no  Any new medications " (BP?): no  Precipitating factors:   Worse with activity: YES-body pain worse with activity, bending over makes her very lightheaded  Worse with head movement: YES- feels more lightheaded and eyes go out of focus with head movement  Alleviating factors:   Does staying in a fixed position give relief: YES- yes briefly  Therapies tried and outcome: eye drops not effective, tens unit only helps when on high setting, stretching difficult due to lack of stability, Aleve/Ibuprofen not effective, THC pain patches make symptoms tolerable but not completely resolved, heating pad helps sometimes      Has not been feeling well since had cold. Right now the worst thing that is happening is the pain. Hard to go up and down the steps due to fatigue and lack of coordination. Is not able to cook or do dishes. Hard time standing in a shower. Dizziness that is there when gets up. Pain not able to move, then weakness comes. Eyes have been really irritating her. Feels like is constantly having to stain and eye are irritated. Nausea is at baseline. Poor appetite. If forces self to eat will only eat a few bites. Numbness and tingling that is intermittent and only comes for a short amount of time. Dull sensation of numbness around them. Nothing that does makes it go away, just has to wait for it to get better on own. Is very tired and fatigued. Has been taking double dose of Vit D and B 12    Review of Systems   Constitutional: Positive for fatigue.   HENT: Negative for ear pain, rhinorrhea and sore throat.    Eyes: Positive for visual disturbance.   Respiratory: Negative for cough, chest tightness, shortness of breath and wheezing.    Cardiovascular: Positive for chest pain and palpitations.   Gastrointestinal: Positive for nausea and vomiting. Negative for abdominal distention, abdominal pain, constipation and diarrhea.   Endocrine: Negative for cold intolerance and heat intolerance.   Musculoskeletal: Positive for arthralgias, gait  problem and myalgias.   Skin: Negative for rash.   Neurological: Positive for dizziness, weakness, numbness and paresthesias. Negative for light-headedness and headaches.        Off balance      Psychiatric/Behavioral: Negative for dysphoric mood and sleep disturbance. The patient is not nervous/anxious.             Objective    /70 (BP Location: Right arm, Patient Position: Sitting, Cuff Size: Adult Regular)   Pulse 79   Temp 97.8  F (36.6  C) (Tympanic)   Resp 16   Wt 59.8 kg (131 lb 12.8 oz)   LMP 10/28/2021   SpO2 98%   BMI 24.11 kg/m    Body mass index is 24.11 kg/m .  Physical Exam  Constitutional:       Appearance: Normal appearance. She is well-developed.   HENT:      Head: Normocephalic and atraumatic.      Right Ear: Tympanic membrane and external ear normal. No middle ear effusion.      Left Ear: Tympanic membrane and external ear normal.  No middle ear effusion.      Nose: No mucosal edema.   Eyes:      Extraocular Movements:      Right eye: Normal extraocular motion and no nystagmus.      Left eye: Normal extraocular motion and no nystagmus.      Pupils: Pupils are equal, round, and reactive to light.   Neck:      Thyroid: No thyromegaly.      Vascular: No carotid bruit.   Cardiovascular:      Rate and Rhythm: Normal rate and regular rhythm.      Heart sounds: Normal heart sounds.   Pulmonary:      Effort: Pulmonary effort is normal.      Breath sounds: Normal breath sounds.   Abdominal:      General: Bowel sounds are normal.      Palpations: Abdomen is soft.   Skin:     General: Skin is warm and dry.   Neurological:      Mental Status: She is alert.      Motor: Weakness (minimal ) present.      Coordination: Romberg sign negative. Finger-Nose-Finger Test normal.   Psychiatric:         Behavior: Behavior normal.

## 2021-11-03 ENCOUNTER — OFFICE VISIT (OUTPATIENT)
Dept: FAMILY MEDICINE | Facility: CLINIC | Age: 36
End: 2021-11-03
Payer: COMMERCIAL

## 2021-11-03 VITALS
DIASTOLIC BLOOD PRESSURE: 70 MMHG | HEART RATE: 79 BPM | RESPIRATION RATE: 16 BRPM | WEIGHT: 131.8 LBS | OXYGEN SATURATION: 98 % | TEMPERATURE: 97.8 F | SYSTOLIC BLOOD PRESSURE: 112 MMHG | BODY MASS INDEX: 24.11 KG/M2

## 2021-11-03 DIAGNOSIS — R20.2 NUMBNESS AND TINGLING: ICD-10-CM

## 2021-11-03 DIAGNOSIS — R53.83 FATIGUE, UNSPECIFIED TYPE: ICD-10-CM

## 2021-11-03 DIAGNOSIS — R42 DIZZINESS: Primary | ICD-10-CM

## 2021-11-03 DIAGNOSIS — M62.81 GENERALIZED MUSCLE WEAKNESS: ICD-10-CM

## 2021-11-03 DIAGNOSIS — R20.0 NUMBNESS AND TINGLING: ICD-10-CM

## 2021-11-03 DIAGNOSIS — M79.10 MYALGIA: ICD-10-CM

## 2021-11-03 DIAGNOSIS — M25.50 MULTIPLE JOINT PAIN: ICD-10-CM

## 2021-11-03 LAB
ALBUMIN SERPL-MCNC: 4 G/DL (ref 3.4–5)
ALP SERPL-CCNC: 48 U/L (ref 40–150)
ALT SERPL W P-5'-P-CCNC: 19 U/L (ref 0–50)
ANION GAP SERPL CALCULATED.3IONS-SCNC: 1 MMOL/L (ref 3–14)
AST SERPL W P-5'-P-CCNC: 10 U/L (ref 0–45)
BASOPHILS # BLD AUTO: 0 10E3/UL (ref 0–0.2)
BASOPHILS NFR BLD AUTO: 1 %
BILIRUB SERPL-MCNC: 0.3 MG/DL (ref 0.2–1.3)
BUN SERPL-MCNC: 10 MG/DL (ref 7–30)
CALCIUM SERPL-MCNC: 8.6 MG/DL (ref 8.5–10.1)
CHLORIDE BLD-SCNC: 108 MMOL/L (ref 94–109)
CO2 SERPL-SCNC: 30 MMOL/L (ref 20–32)
CREAT SERPL-MCNC: 0.69 MG/DL (ref 0.52–1.04)
CRP SERPL-MCNC: <2.9 MG/L (ref 0–8)
EOSINOPHIL # BLD AUTO: 0 10E3/UL (ref 0–0.7)
EOSINOPHIL NFR BLD AUTO: 0 %
ERYTHROCYTE [DISTWIDTH] IN BLOOD BY AUTOMATED COUNT: 12.3 % (ref 10–15)
ERYTHROCYTE [SEDIMENTATION RATE] IN BLOOD BY WESTERGREN METHOD: 5 MM/HR (ref 0–20)
GFR SERPL CREATININE-BSD FRML MDRD: >90 ML/MIN/1.73M2
GLUCOSE BLD-MCNC: 90 MG/DL (ref 70–99)
HCT VFR BLD AUTO: 42.5 % (ref 35–47)
HGB BLD-MCNC: 13.9 G/DL (ref 11.7–15.7)
LYMPHOCYTES # BLD AUTO: 1.3 10E3/UL (ref 0.8–5.3)
LYMPHOCYTES NFR BLD AUTO: 25 %
MCH RBC QN AUTO: 30.2 PG (ref 26.5–33)
MCHC RBC AUTO-ENTMCNC: 32.7 G/DL (ref 31.5–36.5)
MCV RBC AUTO: 92 FL (ref 78–100)
MONOCYTES # BLD AUTO: 0.4 10E3/UL (ref 0–1.3)
MONOCYTES NFR BLD AUTO: 7 %
NEUTROPHILS # BLD AUTO: 3.6 10E3/UL (ref 1.6–8.3)
NEUTROPHILS NFR BLD AUTO: 67 %
PLATELET # BLD AUTO: 250 10E3/UL (ref 150–450)
POTASSIUM BLD-SCNC: 4 MMOL/L (ref 3.4–5.3)
PROT SERPL-MCNC: 7.2 G/DL (ref 6.8–8.8)
RBC # BLD AUTO: 4.61 10E6/UL (ref 3.8–5.2)
SODIUM SERPL-SCNC: 139 MMOL/L (ref 133–144)
TSH SERPL DL<=0.005 MIU/L-ACNC: 2.16 MU/L (ref 0.4–4)
VIT B12 SERPL-MCNC: 1852 PG/ML (ref 193–986)
WBC # BLD AUTO: 5.3 10E3/UL (ref 4–11)

## 2021-11-03 PROCEDURE — 85652 RBC SED RATE AUTOMATED: CPT | Performed by: NURSE PRACTITIONER

## 2021-11-03 PROCEDURE — 86644 CMV ANTIBODY: CPT | Performed by: NURSE PRACTITIONER

## 2021-11-03 PROCEDURE — 82607 VITAMIN B-12: CPT | Performed by: NURSE PRACTITIONER

## 2021-11-03 PROCEDURE — 86038 ANTINUCLEAR ANTIBODIES: CPT | Performed by: NURSE PRACTITIONER

## 2021-11-03 PROCEDURE — 93000 ELECTROCARDIOGRAM COMPLETE: CPT | Performed by: NURSE PRACTITIONER

## 2021-11-03 PROCEDURE — 86665 EPSTEIN-BARR CAPSID VCA: CPT | Mod: 59 | Performed by: NURSE PRACTITIONER

## 2021-11-03 PROCEDURE — 86645 CMV ANTIBODY IGM: CPT | Performed by: NURSE PRACTITIONER

## 2021-11-03 PROCEDURE — 86140 C-REACTIVE PROTEIN: CPT | Performed by: NURSE PRACTITIONER

## 2021-11-03 PROCEDURE — 80050 GENERAL HEALTH PANEL: CPT | Performed by: NURSE PRACTITIONER

## 2021-11-03 PROCEDURE — 82306 VITAMIN D 25 HYDROXY: CPT | Performed by: NURSE PRACTITIONER

## 2021-11-03 PROCEDURE — 86665 EPSTEIN-BARR CAPSID VCA: CPT | Performed by: NURSE PRACTITIONER

## 2021-11-03 PROCEDURE — 36415 COLL VENOUS BLD VENIPUNCTURE: CPT | Performed by: NURSE PRACTITIONER

## 2021-11-03 PROCEDURE — 99214 OFFICE O/P EST MOD 30 MIN: CPT | Performed by: NURSE PRACTITIONER

## 2021-11-03 PROCEDURE — 86200 CCP ANTIBODY: CPT | Performed by: NURSE PRACTITIONER

## 2021-11-03 PROCEDURE — 86431 RHEUMATOID FACTOR QUANT: CPT | Performed by: NURSE PRACTITIONER

## 2021-11-03 PROCEDURE — 86039 ANTINUCLEAR ANTIBODIES (ANA): CPT | Performed by: NURSE PRACTITIONER

## 2021-11-03 RX ORDER — METHYLPREDNISOLONE 4 MG
TABLET, DOSE PACK ORAL
Qty: 21 TABLET | Refills: 0 | Status: SHIPPED | OUTPATIENT
Start: 2021-11-03 | End: 2022-01-15

## 2021-11-03 ASSESSMENT — ENCOUNTER SYMPTOMS
HEADACHES: 0
ARTHRALGIAS: 1
NUMBNESS: 1
SLEEP DISTURBANCE: 0
COUGH: 0
MYALGIAS: 1
NAUSEA: 1
PALPITATIONS: 1
ABDOMINAL PAIN: 0
CONSTIPATION: 0
NERVOUS/ANXIOUS: 0
DIARRHEA: 0
FATIGUE: 1
CHEST TIGHTNESS: 0
LIGHT-HEADEDNESS: 0
SORE THROAT: 0
RHINORRHEA: 0
SHORTNESS OF BREATH: 0
WEAKNESS: 1
DIZZINESS: 1
ABDOMINAL DISTENTION: 0
WHEEZING: 0
VOMITING: 1
DYSPHORIC MOOD: 0
PARESTHESIAS: 1

## 2021-11-03 ASSESSMENT — PAIN SCALES - GENERAL: PAINLEVEL: NO PAIN (0)

## 2021-11-03 NOTE — PATIENT INSTRUCTIONS
Physical therapy will be in touch with you to set up appointment date, time and location.    You can call imaging scheduling to set up appointment date, time, and location that works best for you to have your MRI. [phone]     We will be in touch with the results of your labs.

## 2021-11-04 LAB
ANA PAT SER IF-IMP: ABNORMAL
ANA SER QL IF: ABNORMAL
ANA TITR SER IF: ABNORMAL {TITER}
CCP AB SER IA-ACNC: 1.2 U/ML
CMV IGG SERPL IA-ACNC: 4.3 U/ML
CMV IGG SERPL IA-ACNC: ABNORMAL
CMV IGM SERPL IA-ACNC: <8 AU/ML
CMV IGM SERPL IA-ACNC: NEGATIVE
DEPRECATED CALCIDIOL+CALCIFEROL SERPL-MC: 108 UG/L (ref 20–75)
EBV VCA IGG SER IA-ACNC: 389 U/ML
EBV VCA IGG SER IA-ACNC: POSITIVE
EBV VCA IGM SER IA-ACNC: <10 U/ML
EBV VCA IGM SER IA-ACNC: NORMAL
RHEUMATOID FACT SER NEPH-ACNC: <7 IU/ML

## 2021-11-08 ENCOUNTER — VIRTUAL VISIT (OUTPATIENT)
Dept: PSYCHIATRY | Facility: CLINIC | Age: 36
End: 2021-11-08
Payer: COMMERCIAL

## 2021-11-08 DIAGNOSIS — F42.9 OBSESSIVE-COMPULSIVE DISORDER, UNSPECIFIED TYPE: ICD-10-CM

## 2021-11-08 DIAGNOSIS — F41.1 GAD (GENERALIZED ANXIETY DISORDER): ICD-10-CM

## 2021-11-08 DIAGNOSIS — F33.2 SEVERE EPISODE OF RECURRENT MAJOR DEPRESSIVE DISORDER, WITHOUT PSYCHOTIC FEATURES (H): Primary | ICD-10-CM

## 2021-11-08 PROCEDURE — 99204 OFFICE O/P NEW MOD 45 MIN: CPT | Mod: 95 | Performed by: NURSE PRACTITIONER

## 2021-11-08 NOTE — PROGRESS NOTES
Sheree is a 36 year old who is being evaluated via a billable video visit.      How would you like to obtain your AVS? MyChart  If the video visit is dropped, the invitation should be resent by: Text to cell phone: [phone]  Will anyone else be joining your video visit? No      Video Start Time: 2:56 PM  Video-Visit Details    Type of service:  Video Visit    Video End Time:4:13 PM    Originating Location (pt. Location): Home    Distant Location (provider location):  [facility]     Platform used for Video Visit: [facility]                                                        Outpatient Psychiatric Evaluation - Standard Adult    Name:  Leticia Lawson  : 1985    Source of Referral:  Primary Care Provider: Physician No Ref-Primary   Last visit: November 3, 2021  Current Psychotherapist: none       Identifying Data:  Patient is a 36 year old,   White Not  or  female  who presents for initial visit with me.  Patient is currently employed part time. Patient attended the session alone. Consent to communicate signed for no one . Consent for treatment signed and included in electronic medical record. Discussed limits of confidentiality today. My Practice Policy was reviewed and signed.     Patient prefers to be called: Sheree     Chief Complaint:    Chief Complaint   Patient presents with      Treatment Plan         HPI:      Sheree is a 36-year-old female visiting with me today to explore medication options to manage her symptoms of anxiety and depression.  She tells me she was diagnosed during adolescence with generalized anxiety, obsessive-compulsive disorder, and social anxiety.  Symptoms include skin picking on her hands.  She has compulsive thoughts of vomiting.  For many years, Sheree tells me she has experienced nausea and has been unable to work or drive.  Some days it is difficult for her to get out of bed.  Through the years her  "thoughts have been getting a lot better.  Anxiety has lessened now that her children are getting older and are more self-sufficient.  Yet she continues to get intrusive thoughts that increase anxiety.  It can take \"hours\" to talk herself out of getting a panic attack and being in anxiety for prolonged.'s of time.  With regards to her appetite she eats.  She denies ever being underweight.  Sleep is fine as she is able to fall asleep and usually stays asleep.  However she always wakes up tired and never feels rested.  [place] sleep study is [place] in [place] numberAutumn TATE first began experiencing thoughts of suicide when she was in high school.  She became easily overwhelmed with life stressors during the and would wait till the thoughts of go away.  This was partly due to never feeling accepted by her peers.  A protective factor for her now is being in a healthy relationship with her partner.  She has a dog that is an emotional support for her.  Her 6 children ranging in ages of 19 to 7 years.  While her days are stressful, she has been better at getting time for herself by reading and playing games.    While she does have some days, Sheree tells me she mostly feels down.  She has concerns for having attention deficit issues.  At this point she works as a  and is supposed to work in the office but it can be distracting to her.  Physical symptoms of weakness and pain impact her abilities to perform her job duties completely.  She is uncomfortable driving and limits herself to locations within a mile from her home.  And has been living in [place] for the past year.  She has been in multiple relationships.  While she is still  on paper to the father of her youngest child, they  in 2019.    With regards to her medication, for the first few months she felt the medications were not working very well but now things have stabilized.  She admits to getting impatient with medications in general, expecting " instant results to feeling better.      Psychiatric Review of Symptoms:  Depression: Depressed Mood  Sleep: always tired   Energy: Decrease  Appetite: Decrease   Suicide: No  Ruminations: Increase    PHQ-9 scores:   PHQ-9 SCORE 8/18/2021   PHQ-9 Total Score 13     Maria A:  No symptoms   MDQ Score: Negative Screen  Anxiety: Feeling nervous, anxious, or on edge  Worrying too much about different things  Trouble relaxing  Thoughts of impending doom    TORRI-7 scores:    TORRI-7 SCORE 8/18/2021   Total Score 10     Panic:  Sweating  Palpitations  Shortness of Breath   Agoraphobia:  No   PTSD:  History of Trauma   OCD:  Ego dystonic thoughts   Psychosis: No symptoms   ADD / ADHD: never diafnosed in school  Gambling or shoplifting: No   Eating Disorder:  Restriction  Sleep:   Trouble staying asleep     Psychiatric History:   Hospitalizations:none  History of Commitment? No   Past Treatment: counseling, medication(s) from physician / PCP and psychiatry  Suicide Attempts:  none  Self-injurious Behavior: Cutting or Carving of Skin and in high school  Electroconvulsive Therapy (ECT) or Transcranial Magnetic Stimulation (TMS): No   Genetic Testing: No     Substance Use History:  Current use of drugs or alcohol: Alcohol    Patient reports no problems as a result of their drinking / drug use.   Based on the clinical interview, there  are not indications of drug or alcohol abuse.  Tobacco use: Yes E-cigarettes  Ready to quit?  No  Nicotine Replacement Therapy tried: nnone   Caffeine:  Yes  2 cups/day of coffee  Patient has not received chemical dependency treatment in the past  Recovery Programming Involvement: None    Past Medical History:  Past Medical History:   Diagnosis Date     Abnormal Pap smear of cervix 08/18/2021     Depressive disorder 2001     Thyroid nodule       Surgery:   Past Surgical History:   Procedure Laterality Date     ABDOMEN SURGERY  2019    Tubal ligation     D & C  2010     EGD       GI SURGERY  2011    EGD      TUBAL LIGATION  04/19/2019     Allergies:     Allergies   Allergen Reactions     Sulfa Drugs      Primary Care Provider: Physician No Ref-Primary  Seizures or Head Injury: No  Diet: No Restrictions  Food Allergies: No   Exercise: No regular exercise program  Supplements: Reviewed per Electronic Medical Record Today    Cyanocobalamin 5000 MCG TBDP,   methylPREDNISolone (MEDROL DOSEPAK) 4 MG tablet therapy pack, Follow Package Directions  omeprazole 20 MG tablet, Take 20 mg by mouth daily  sertraline (ZOLOFT) 50 MG tablet, Take 1 tablet (50 mg) by mouth daily  vitamin D3 (CHOLECALCIFEROL) 250 mcg (88478 units) capsule, Take 1 capsule by mouth daily    No current facility-administered medications on file prior to visit.       The [place] Prescription Monitoring Program has been reviewed and there are no concerns about diversionary activity for controlled substances at this time.      Vital Signs:  Vitals: LMP 10/28/2021     Labs:    Most recent labs reviewed and no new labs.   Most recent EKG from November 2021 reviewed. QTc interval 403.  Normal EKG.    Review of Systems:  10 systems (general, cardiovascular, respiratory, eyes, ENT, endocrine, GI, , M/S, neurological) were reviewed. Most pertinent finding(s) is/are:joint and muscle pain, lower back pain,   . The remaining systems are all unremarkable.  Family History:   Patient reported family history includes:   Family History   Problem Relation Age of Onset     Ovarian Cancer Mother      Hyperlipidemia Mother      Depression Mother      Thyroid Disease Mother         Hypothyroidism     Alzheimer Disease Maternal Grandmother      Hyperlipidemia Maternal Grandmother      Lung Cancer Maternal Grandfather      Breast Cancer Paternal Grandmother      Bone Cancer Paternal Grandmother      Emphysema Paternal Grandfather      Bipolar Disorder Sister      Mental Illness Sister         Bipolar Disorder     Depression Other      Anxiety Disorder Other      Asthma Other       Mental Illness History: Yes: Depression, OCD, Severe Post Partum Depression, Bipolar Disorder, ADHD, possible ASD  Substance Abuse History: Yes: Alcoholism, drug addiction  Suicide History: Yes: 2 oldest children - no attempts  Medications: Unknown     Social History:   Born: [facility]  Raised: [place]  Childhood: Father alcoholism.  Olivious to this until adult.  Parents  when she was 8 years old.    Siblings:  Younger sister and younger brother  Highest education level was high school graduate, some college and joined the [employer].   Employment History:  yes  Childhood illnesses: Allergies: seasonal Asthma exercised induced  Current Living situation:  [place]  with partner and 6 children . Feels safe at home.  Children: yes   Firearms/Weapons Access: No: Patient denies   Service: Yes Branch: [employer] and Family member(s) served: [employer] and [employer]    Mental Status Examination:     Appearance:  awake, alert and casually dressed  Attitude:  cooperative   Eye Contact:  adequate  Gait and Station: No assistive Devices used and No dizziness or falls  Psychomotor Behavior:  intact station, gait and muscle tone  Oriented to:  time, person, and place  Attention Span and Concentration:  Normal  Speech:  clear, coherent and Speaks: English  Mood:  anxious and depressed  Affect:  mood congruent  Associations:  no loose associations  Thought Process:  goal oriented  Thought Content:  no evidence of suicidal ideation or homicidal ideation, no auditory hallucinations present and no visual hallucinations present  Recent and Remote Memory:  intact Not formally assessed. No amnesia.  Fund of Knowledge: appropriate  Insight:  good  Judgment:  intact  Impulse Control:  intact    Suicide Risk Assessment:  Today Leticia Lawson reports that she is having no thoughts to want to end her life or to harm other people. In addition, there are notable risk factors for self-harm, including anxiety and mood change. However,  "risk is mitigated by commitment to family, history of seeking help when needed, future oriented, denies suicidal intent or plan and denies homicidal ideation, intent, or plan. Therefore, based on all available evidence including the factors cited above, Leticia Lawson does not appear to be at imminent risk for self-harm, does not meet criteria for a 72-hr hold, and therefore remains appropriate for ongoing outpatient level of care.  A thorough assessment of risk factors related to suicide and self-harm have been reviewed and are noted above. The patient convincingly denies acute suicidality on several occasions. Local community safety resources reviewed and printed for patient to use if needed. There was no deceit detected, and the patient presented in a manner that was believable.     DSM5  Diagnosis:  296.33 (F33.2) Major Depressive Disorder, Recurrent Episode, Severe _ and With mixed features  300.02 (F41.1) Generalized Anxiety Disorder  300.3 (F42) Other Specified Obsessive Compulsive and Related Disorder    Medical Comorbidities Include:   Patient Active Problem List    Diagnosis Date Noted     ASCUS of cervix with negative high risk HPV 08/27/2021     Priority: Medium     \"Did have abnormal in 20s. Had colposcopy that was normal. Then had 2 more paps that were abnormal, since those have all been normal since.\" 2021 2019 NIL Pap, Neg HPV  8/18/21 ASCUS pap, Neg HPV. Plan cotest in 3 years.       Obsessive-compulsive disorder, unspecified type 08/18/2021     Priority: Medium     Severe episode of recurrent major depressive disorder, without psychotic features (H) 08/18/2021     Priority: Medium     TORRI (generalized anxiety disorder) 08/18/2021     Priority: Medium     Thyroid nodule 08/18/2021     Priority: Medium       A 12-item WHODAS 2.0 assessment was completed by the patient today and recorded in [facility].  No flowsheet data found.    The Patient Activation Measure (ZOHRA) score was completed and recorded in " EPIC. This assesses patient knowledge, skill, and confidence for self-management. No flowsheet data found.             Impression:  Leticia Lawson met with me to talk about her medications that are managing her symptoms of depression and anxiety.  She has suffered from symptoms of anxiety since high school years.  Multiple relationships and family stressors have contributed to her difficulties in stabilizing her mood.  I asked her to consider talk therapy in order to develop coping skills to manage her feelings.  She tells me she has obsessive-compulsive tendencies with regards to completing tasks in certain ways.  I asked her to consider deep TMS to manage those symptoms or consider changing to clomipramine.  Through the years she has had multiple medication trials and a GeneSight test kit will be mailed to her home to make future medication choices.  Consistently taking medications has been difficult for her.  At this point she tells me she feels somewhat stable on the sertraline and that we will continue at 50 mg daily.    Medication side effects and alternatives reviewed. Health promotion activities recommended and reviewed today. All questions addressed. Education and counseling completed regarding risks and benefits of medications and psychotherapy options. Collaborative Care Psychiatry Service model reviewed today. Recommend therapy for additional support.     Treatment Plan:     1.  Continue sertraline 50 mg daily  2.  GeneSight test kit to be mailed to your home to make future medication choices  3.  Consider changing to clomipramine for your OCD symptoms  4.  Consider deep TMS to manage her symptoms of OCD  5.  Consider talk therapy as an option to process coping skills to manage her anxiety        Continue all other medical directions per primary care provider.     Continue all other medications as reviewed per electronic medical record today.     Safety plan reviewed. To the Emergency Department as  needed or call after hours crisis line at [phone] or [phone]. [place] Crisis Text Line: Text [place] to 056164  or  Suicide LifeLine Chat: suicideSixteen Eighteen Design.org/chat/    To schedule individual or family therapy, call [facility] at [phone].     Schedule an appointment with me in 6 weeks or sooner as needed.  Call [facility] at [phone] to schedule.    Follow up with primary care provider as planned or for acute medical concerns.    Call the psychiatric nurse line with medication questions or concerns at [phone].    [employer] may be used to communicate with your provider, but this is not intended to be used for emergencies.    Crisis Resources:    National Suicide Prevention Lifeline: 812.403.7210 (TTY: 837.487.7169). Call anytime for help.  (www.suicidepreventionlifeline.org)  National [place] on Mental Illness (www.darlene.org): 558.158.1484 or 284-108-4348.   Mental Health Association (www.mentalhealth.org): 351.816.2121 or 398-057-8646.  [place] Crisis Text Line: Text [place] to 638939  Suicide LifeLine Chat: suicideSixteen Eighteen Design.org/chat    Administrative Billing:   Time spent with patient was 60 minutes and greater than 50% of time or 40 minutes was spent in counseling and coordination of care regarding above diagnoses and treatment plan.    Patient Status:  Patient will continue to be seen for ongoing consultation and stabilization.    Signed:   DENY Luque-BC   Psychiatry

## 2021-11-08 NOTE — Clinical Note
Sheree Contreras is agreeable to having genetic testing done to make future medication choices.  I suggested adding clomipramine instead of sertraline but we will wait to see what the results revealed.  She will continue taking sertraline 50 mg daily.  We talked briefly about deep TMS to manage anxiety for her.  She will also consider talk therapy to learn coping skills to manage her complex life situation.  Thank you for the referral.  Wendy

## 2021-11-08 NOTE — PATIENT INSTRUCTIONS
1.  Continue sertraline 50 mg daily  2.  e-Booking.comight test kit to be mailed to your home to make future medication choices  3.  Consider changing to clomipramine for your OCD symptoms  4.  Consider deep TMS to manage her symptoms of OCD  5.  Consider talk therapy as an option to process coping skills to manage her anxiety        Continue all other medical directions per primary care provider.     Continue all other medications as reviewed per electronic medical record today.     Safety plan reviewed. To the Emergency Department as needed or call after hours crisis line at [phone] or [phone]. [place] Crisis Text Line: Text [place] to 368305  or  Suicide LifeLine Chat: Navdy.org/chat/    To schedule individual or family therapy, call [facility] at [phone].     Schedule an appointment with me in 6 weeks or sooner as needed.  Call [facility] at [phone] to schedule.    Follow up with primary care provider as planned or for acute medical concerns.    Call the psychiatric nurse line with medication questions or concerns at [phone].    [employer]ision may be used to communicate with your provider, but this is not intended to be used for emergencies.    Crisis Resources:    National Suicide Prevention Lifeline: 694.124.5620 (TTY: 789.472.5710). Call anytime for help.  (www.suicidepreventionlifeline.org)  National [place] on Mental Illness (www.darlene.org): 928.606.7273 or 360-578-0040.   Mental Health Association (www.mentalhealth.org): 553.909.3726 or 479-518-5901.  [place] Crisis Text Line: Text [place] to 157526  Suicide LifeLine Chat: Navdy.org/chat

## 2021-11-11 ENCOUNTER — ANCILLARY PROCEDURE (OUTPATIENT)
Dept: MRI IMAGING | Facility: CLINIC | Age: 36
End: 2021-11-11
Attending: NURSE PRACTITIONER
Payer: COMMERCIAL

## 2021-11-11 DIAGNOSIS — R20.2 NUMBNESS AND TINGLING: ICD-10-CM

## 2021-11-11 DIAGNOSIS — R20.0 NUMBNESS AND TINGLING: ICD-10-CM

## 2021-11-11 DIAGNOSIS — M62.81 GENERALIZED MUSCLE WEAKNESS: ICD-10-CM

## 2021-11-11 DIAGNOSIS — R42 DIZZINESS: ICD-10-CM

## 2021-11-11 PROCEDURE — A9585 GADOBUTROL INJECTION: HCPCS | Performed by: RADIOLOGY

## 2021-11-11 PROCEDURE — 70553 MRI BRAIN STEM W/O & W/DYE: CPT | Mod: TC | Performed by: RADIOLOGY

## 2021-11-11 RX ORDER — GADOBUTROL 604.72 MG/ML
5.9 INJECTION INTRAVENOUS ONCE
Status: COMPLETED | OUTPATIENT
Start: 2021-11-11 | End: 2021-11-11

## 2021-11-11 RX ADMIN — GADOBUTROL 5.9 ML: 604.72 INJECTION INTRAVENOUS at 17:14

## 2021-11-12 ENCOUNTER — E-CONSULT (OUTPATIENT)
Dept: NEUROLOGY | Facility: CLINIC | Age: 36
End: 2021-11-12
Payer: COMMERCIAL

## 2021-11-12 DIAGNOSIS — R20.0 NUMBNESS AND TINGLING: ICD-10-CM

## 2021-11-12 DIAGNOSIS — R53.83 FATIGUE, UNSPECIFIED TYPE: ICD-10-CM

## 2021-11-12 DIAGNOSIS — R90.89 ABNORMAL FINDING ON MRI OF BRAIN: Primary | ICD-10-CM

## 2021-11-12 DIAGNOSIS — M62.81 GENERALIZED MUSCLE WEAKNESS: ICD-10-CM

## 2021-11-12 DIAGNOSIS — M79.10 MYALGIA: ICD-10-CM

## 2021-11-12 DIAGNOSIS — R20.2 NUMBNESS AND TINGLING: ICD-10-CM

## 2021-11-12 DIAGNOSIS — R42 DIZZINESS: ICD-10-CM

## 2021-11-12 PROCEDURE — 99207 E-CONSULT TO NEUROLOGY (ADULT OUTPT PROVIDER TO SPECIALIST WRITTEN QUESTION & RESPONSE): CPT | Performed by: NURSE PRACTITIONER

## 2021-11-12 PROCEDURE — 99451 NTRPROF PH1/NTRNET/EHR 5/>: CPT | Performed by: PSYCHIATRY & NEUROLOGY

## 2021-11-12 NOTE — PROGRESS NOTES
ALL SMARTFIELDS MUST BE COMPLETED FOR PATIENT CARE AND BILLING    11/12/2021     E-Consult has been accepted.    Interprofessional consultation requested by:  Liya Chaudhry APRN CNP      Clinical Question/Purpose: MY CLINICAL QUESTION IS: Presented to clinic with extreme fatigue, dizziness, numbness and tingling, myalgias. MRI completed due to suspicion for MS. Please see MRI results from November 11.    Patient assessment and information reviewed: MRI brain and E consult question.      Recommendations:   I am assuming the consult question is could the MRI explain patient's symptoms or is it consistent with MS.  If that is the case, I do not think this MRI would explain patient's symptoms.  It is also not consistent with MS. this is essentially a normal MRI for age and the very few white matter changes seen would not be expected to be causing any symptoms.  If there is a different consult question please let me know.        The recommendations provided in this E-Consult are based on the clinical data available to me in the medical record, and are furnished without the benefit of a comprehensive in-person or virtual patient evaluation.  This consultation should not replace the clinical judgement and evaluation of the provider ordering this E-Consult. Any new clinical issues, or changes in patient status since the filing of this E-Consult will need to be taken into account when assessing these recommendations. Please contact me if you have further questions.    My total time spent reviewing clinical information and formulating assessment was 10 minutes.    Report sent automatically to requesting provider once signed.     Justino Romero, DO

## 2021-11-15 DIAGNOSIS — M25.50 MULTIPLE JOINT PAIN: Primary | ICD-10-CM

## 2021-11-15 DIAGNOSIS — R76.8 POSITIVE ANA (ANTINUCLEAR ANTIBODY): ICD-10-CM

## 2021-11-19 ENCOUNTER — MYC MEDICAL ADVICE (OUTPATIENT)
Dept: FAMILY MEDICINE | Facility: CLINIC | Age: 36
End: 2021-11-19
Payer: COMMERCIAL

## 2021-12-23 ENCOUNTER — VIRTUAL VISIT (OUTPATIENT)
Dept: RHEUMATOLOGY | Facility: CLINIC | Age: 36
End: 2021-12-23
Attending: NURSE PRACTITIONER
Payer: COMMERCIAL

## 2021-12-23 DIAGNOSIS — R53.82 CHRONIC FATIGUE: ICD-10-CM

## 2021-12-23 DIAGNOSIS — G47.8 NON-RESTORATIVE SLEEP: ICD-10-CM

## 2021-12-23 DIAGNOSIS — G89.29 CHRONIC BUTTOCK PAIN: ICD-10-CM

## 2021-12-23 DIAGNOSIS — R76.8 POSITIVE ANA (ANTINUCLEAR ANTIBODY): Primary | ICD-10-CM

## 2021-12-23 DIAGNOSIS — M79.18 CHRONIC BUTTOCK PAIN: ICD-10-CM

## 2021-12-23 DIAGNOSIS — M54.50 CHRONIC BILATERAL LOW BACK PAIN, UNSPECIFIED WHETHER SCIATICA PRESENT: ICD-10-CM

## 2021-12-23 DIAGNOSIS — M25.50 MULTIPLE JOINT PAIN: ICD-10-CM

## 2021-12-23 DIAGNOSIS — M54.2 NECK PAIN: ICD-10-CM

## 2021-12-23 DIAGNOSIS — G89.29 CHRONIC BILATERAL LOW BACK PAIN, UNSPECIFIED WHETHER SCIATICA PRESENT: ICD-10-CM

## 2021-12-23 DIAGNOSIS — R53.1 FEELING WEAK: ICD-10-CM

## 2021-12-23 PROCEDURE — 99205 OFFICE O/P NEW HI 60 MIN: CPT | Mod: 95 | Performed by: INTERNAL MEDICINE

## 2021-12-23 RX ORDER — CYCLOBENZAPRINE HCL 5 MG
TABLET ORAL
Qty: 45 TABLET | Refills: 2 | Status: SHIPPED | OUTPATIENT
Start: 2021-12-23

## 2021-12-23 RX ORDER — MELOXICAM 7.5 MG/1
TABLET ORAL
Qty: 60 TABLET | Refills: 2 | Status: SHIPPED | OUTPATIENT
Start: 2021-12-23 | End: 2022-03-30

## 2021-12-23 RX ORDER — METOCLOPRAMIDE 10 MG/1
TABLET ORAL
COMMUNITY
Start: 2018-07-17

## 2021-12-23 NOTE — PATIENT INSTRUCTIONS
Summary of Your Rheumatology Visit      Next Appointment:  3 Months      Medications:    Please follow directives on pill bottle on how to take medication(s) provided.    When starting 2 or more new medications, recommend spacing out the new medications by at least 3 days, this way if you have an allergic reaction there is a greater chance of associating the cause for reaction.      Referrals:    Spine clinic    Tests:     Please have labs and x-rays that were ordered performed.        Injections:      Other:

## 2021-12-23 NOTE — PROGRESS NOTES
Leticia Lawson who presents today with a chief complaint of  No chief complaint on file.      Joint Pains: Yes  Location: bl knees, hips, shoulders, and left wrist  Onset: 4 months  Intensity:  2/10  AM Stiffness: 60 Minutes  Alleviating/Aggravating Factors: walking, stairs, standing increase pain. Medications helpful?  Tolerating Meds: Yes  Other:      ROS:  Patient denies having: +persistent dry eyes, dry mouth, recurrent oral ulcers, patchy alopecia, active rashes, +photosensitivity (years), history of psoriasis, +active chest pain (couple times a week), +active shortness of breath, +active cough, active dysuria, history of kidney stones, active abdominal pain, active diarrhea, history of hematochezia, active dysphagia, history of peptic ulcer disease, history of HIV, tuberculosis, hepatitis B or C, Lyme disease, seizure history, raynaud's, active documented fevers, recent infections, difficulty sleeping or chronic unrefreshing sleep, involuntary weight loss, +loss of appetite, +excessive fatigue, +depression, +anxiety,  +recurrent sinus infections, history of inflammatory eye diseases (such as uveitis, scleritis, iritis, etc).       Information gathered by medical assistant incorporated into this note, was reviewed and discussed with the patient.    Problem List:  Patient Active Problem List   Diagnosis     Obsessive-compulsive disorder, unspecified type     Severe episode of recurrent major depressive disorder, without psychotic features (H)     TORRI (generalized anxiety disorder)     Thyroid nodule     ASCUS of cervix with negative high risk HPV        PMH:   Past Medical History:   Diagnosis Date     Abnormal Pap smear of cervix 08/18/2021     Depressive disorder 2001     Thyroid nodule        Surgical History:  Past Surgical History:   Procedure Laterality Date     ABDOMEN SURGERY  2019    Tubal ligation     D & C  2010     EGD       GI SURGERY  2011    EGD     TUBAL LIGATION  04/19/2019       Family  History:  Family History   Problem Relation Age of Onset     Ovarian Cancer Mother      Hyperlipidemia Mother      Depression Mother      Thyroid Disease Mother         Hypothyroidism     Alzheimer Disease Maternal Grandmother      Hyperlipidemia Maternal Grandmother      Lung Cancer Maternal Grandfather      Breast Cancer Paternal Grandmother      Bone Cancer Paternal Grandmother      Emphysema Paternal Grandfather      Bipolar Disorder Sister      Mental Illness Sister         Bipolar Disorder     Depression Other      Anxiety Disorder Other      Asthma Other        Social History:   reports that she quit smoking about 6 years ago. She started smoking about 20 years ago. She smoked 0.00 packs per day for 0.00 years. She has never used smokeless tobacco. She reports current alcohol use. She reports that she does not use drugs.    Allergies:  Allergies   Allergen Reactions     Sulfa Drugs         Current Medications:  Current Outpatient Medications   Medication Sig Dispense Refill     Cyanocobalamin 5000 MCG TBDP        methylPREDNISolone (MEDROL DOSEPAK) 4 MG tablet therapy pack Follow Package Directions 21 tablet 0     omeprazole 20 MG tablet Take 20 mg by mouth daily       sertraline (ZOLOFT) 50 MG tablet Take 1 tablet (50 mg) by mouth daily 90 tablet 1     vitamin D3 (CHOLECALCIFEROL) 250 mcg (09031 units) capsule Take 1 capsule by mouth daily             Physical Exam:  Following up today via video visit, per Covid-19 pandemic requirements.    Verbal consent has been obtained for this service by care team member.    Video call start time: 9:12 AM    Video call end time: 9:54 AM    Doximity utilized for video call.    Phone number utilized: [phone]    Patient location for video visit: Home     Provider location for video visit:  Home (working remotely)        Summary/Assessment:    Pleasant 36-year-old female presents with 4 months worth of joint pains and sensations of weakness involving upper extremities  and borderline positive FIONA.    Patient explains that she is been experiencing some pains involving primarily shoulders, lateral hips, right greater than left knee.      Sometimes has some discomfort involving her left wrist.  Has history of chronic low back pains with occasional radiculopathy, has seen chiropractor in the past.  Sometimes has upper back and neck pains.  Has not pursued PT.    Describes sometimes has some sensations of weakness when trying to lift her arms.  Denies having associated myalgias.  Does sometimes have associated joint pains.  Denies having any weakness when climbing stairs.  Patient is able to raise arms above head and perform external/internal rotation.  Does have some discomfort with bilateral active internal rotation, noted on video exam.  Patient is able to raise herself from seated position unassisted, noted on video exam.    Denies having joint swelling.    Occasionally has paresthesias involving hands and feet, has history of carpal tunnel syndrome bilaterally.    Has not seen neurology for weakness or paresthesias.    States had brain MRI which was unrevealing.    Noted to have borderline positive FIONA.  Negative rheumatoid factor, CCP antibody, ESR and CRP    Admits to chronic nonrestorative sleep and chronic fatigue.    Denies family history for connective tissue disease.    Denies personal or family history for psoriasis.    Denies history of blood clots.    Has had one for stress or miscarriage.    Denies history of peptic ulcer disease.  Has had sensitive stomach in the past.    Has tried 800 mg of ibuprofen and 2 tablets of Aleve with insufficient benefit.    Has tried cannabis patch obtained when traveling in [place] with transient benefit.  Patient not associated with pain clinic.    Tried Medrol Dosepak, increased her stamina and improved her weakness and stiffness a bit.    States is 5 foot 1 and weighs 130 pounds.    Given the above, difficult to tell with certainty  at this time as to what the primary source is contributing to symptoms described.  Will obtain some lab/x-rays and correlate clinically.    Please see below for management plan.      Pertinent rheumatology/past medical history (please refer to above for more detailed history):      Positive FIONA (1-40 titer with speckled pattern)    Feelings of weakness (upper extremities)    Chronic multiple joint pains (shoulders, lateral hips, knees r>l)    Possible trochanteric bursitis (worsening lateral hip pains when lying on her sides)    Chronic low back pain with occasional radiculopathy bilaterally    Chronic buttock pain with stiffness    Chronic neck/upper back pain    Paresthesias hands and feet    History carpal tunnel syndrome bilaterally    Chronic nonrestorative sleep with chronic fatigue    History of B12 deficiency and overcompensating    History of vitamin D deficiency and overcompensating     History anxiety/depression (established with a counselor)      Rheumatology medications provided/suggested:    Meloxicam  Flexeril      Pertinent medication from other providers or from otc (please refer to above for more detailed med list):    Zoloft  Vitamin D      Pertinent medications already tried:     Tylenol (upset stomach)  Ibuprofen (ineffective)  Aleve (ineffective)  Medrol Dosepak (partial benefit)  Tramadol (> 15 yrs ago for back pains, grogginess)    Pertinent lab history:    Positive/elevated: FIONA    Negative/unremarkable: Rheumatoid factor, CCP antibody, ESR, CRP, BMP, LFTs, TSH, CBC    Pertinent imaging/test history:    Brain MRI, unrevealing.    Other:    Marital status:       How many kids:  6 (ranging in age from 8 to 19 years old).      Type of work:  yes, , was in [employer].    Drinking alcohol: yes. 4 times a week    Tobacco use: yes, electronic cig    Recreational drug use: no    Active contraceptive: no     History hysterectomy: no     Tubal ligation: yes          Plan:      We will add Flexeril prior to bedtime for chronic low back pains, occasional upper back/neck pains and nonrestorative sleep.      We will add meloxicam 7.5 mg twice daily, as needed.  Hold other NSAIDs while on meloxicam.  Has omeprazole on med list.      Will refer to spine clinic for chronic low back pains with occasional radiculopathy and neck pain.    Will obtain x-rays of: Left wrist, right knee, hips and SI joints.    If symptoms persist and work-up is unrevealing, a consideration is referring to PT for shoulder, knee and lateral hip pains.    Given positive FIONA we will continue to monitor for any signs and symptoms consistent with having an active connective tissue disease and manage accordingly.    Will obtain some labs and correlate clinically.    Follow-up in 3 months.          Procedure note:     Total time spent 75 minutes involved with patient care, includes placing orders, reviewing records and formulating management plan.      Major side effect profile of medications provided/suggested were discussed with the patient.    This note was transcribed using Dragon voice recognition software as a result unintentional grammatical errors or word substitutions may have occurred. Please contact our Rheumatology department if you need any clarification or if you have any related inquiries.    Thank you for referring this patient to our clinic.      Jose Carlos Sanchez DO ..................  12/23/2021   8:47 AM

## 2021-12-28 ENCOUNTER — ANCILLARY PROCEDURE (OUTPATIENT)
Dept: GENERAL RADIOLOGY | Facility: CLINIC | Age: 36
End: 2021-12-28
Attending: INTERNAL MEDICINE
Payer: COMMERCIAL

## 2021-12-28 DIAGNOSIS — M25.50 MULTIPLE JOINT PAIN: ICD-10-CM

## 2021-12-28 DIAGNOSIS — R76.8 POSITIVE ANA (ANTINUCLEAR ANTIBODY): ICD-10-CM

## 2021-12-28 DIAGNOSIS — G89.29 CHRONIC BUTTOCK PAIN: ICD-10-CM

## 2021-12-28 DIAGNOSIS — G89.29 CHRONIC BILATERAL LOW BACK PAIN, UNSPECIFIED WHETHER SCIATICA PRESENT: ICD-10-CM

## 2021-12-28 DIAGNOSIS — M54.50 CHRONIC BILATERAL LOW BACK PAIN, UNSPECIFIED WHETHER SCIATICA PRESENT: ICD-10-CM

## 2021-12-28 DIAGNOSIS — M79.18 CHRONIC BUTTOCK PAIN: ICD-10-CM

## 2021-12-28 PROCEDURE — 72200 X-RAY EXAM SI JOINTS: CPT | Performed by: RADIOLOGY

## 2021-12-28 PROCEDURE — 73562 X-RAY EXAM OF KNEE 3: CPT | Mod: RT | Performed by: RADIOLOGY

## 2021-12-28 PROCEDURE — 73110 X-RAY EXAM OF WRIST: CPT | Mod: LT | Performed by: RADIOLOGY

## 2021-12-30 ENCOUNTER — LAB (OUTPATIENT)
Dept: LAB | Facility: CLINIC | Age: 36
End: 2021-12-30
Payer: COMMERCIAL

## 2021-12-30 DIAGNOSIS — R53.82 CHRONIC FATIGUE: ICD-10-CM

## 2021-12-30 DIAGNOSIS — G89.29 CHRONIC BUTTOCK PAIN: ICD-10-CM

## 2021-12-30 DIAGNOSIS — M25.50 MULTIPLE JOINT PAIN: ICD-10-CM

## 2021-12-30 DIAGNOSIS — M54.50 CHRONIC BILATERAL LOW BACK PAIN, UNSPECIFIED WHETHER SCIATICA PRESENT: ICD-10-CM

## 2021-12-30 DIAGNOSIS — G89.29 CHRONIC BILATERAL LOW BACK PAIN, UNSPECIFIED WHETHER SCIATICA PRESENT: ICD-10-CM

## 2021-12-30 DIAGNOSIS — R76.8 POSITIVE ANA (ANTINUCLEAR ANTIBODY): ICD-10-CM

## 2021-12-30 DIAGNOSIS — M79.18 CHRONIC BUTTOCK PAIN: ICD-10-CM

## 2021-12-30 DIAGNOSIS — R53.1 FEELING WEAK: ICD-10-CM

## 2021-12-30 LAB
ALBUMIN UR-MCNC: NEGATIVE MG/DL
APPEARANCE UR: CLEAR
B BURGDOR IGG+IGM SER QL: 0.52
BILIRUB UR QL STRIP: NEGATIVE
CK SERPL-CCNC: 113 U/L (ref 30–225)
COLOR UR AUTO: YELLOW
CREAT UR-MCNC: 43 MG/DL
CRP SERPL-MCNC: <2.9 MG/L (ref 0–8)
ERYTHROCYTE [SEDIMENTATION RATE] IN BLOOD BY WESTERGREN METHOD: 6 MM/HR (ref 0–20)
GLUCOSE UR STRIP-MCNC: NEGATIVE MG/DL
HCV AB SERPL QL IA: NONREACTIVE
HGB UR QL STRIP: NEGATIVE
KETONES UR STRIP-MCNC: NEGATIVE MG/DL
LEUKOCYTE ESTERASE UR QL STRIP: NEGATIVE
MICROALBUMIN UR-MCNC: <5 MG/L
MICROALBUMIN/CREAT UR: NORMAL MG/G{CREAT}
NITRATE UR QL: NEGATIVE
PH UR STRIP: 5 [PH] (ref 5–7)
RBC #/AREA URNS AUTO: ABNORMAL /HPF
SP GR UR STRIP: 1.01 (ref 1–1.03)
SQUAMOUS #/AREA URNS AUTO: ABNORMAL /LPF
URATE SERPL-MCNC: 3.4 MG/DL (ref 2.6–6)
UROBILINOGEN UR STRIP-ACNC: 0.2 E.U./DL
WBC #/AREA URNS AUTO: ABNORMAL /HPF

## 2021-12-30 PROCEDURE — 86235 NUCLEAR ANTIGEN ANTIBODY: CPT | Mod: 59

## 2021-12-30 PROCEDURE — 99000 SPECIMEN HANDLING OFFICE-LAB: CPT

## 2021-12-30 PROCEDURE — 86147 CARDIOLIPIN ANTIBODY EA IG: CPT

## 2021-12-30 PROCEDURE — 36415 COLL VENOUS BLD VENIPUNCTURE: CPT

## 2021-12-30 PROCEDURE — 86235 NUCLEAR ANTIGEN ANTIBODY: CPT

## 2021-12-30 PROCEDURE — 84550 ASSAY OF BLOOD/URIC ACID: CPT

## 2021-12-30 PROCEDURE — 82550 ASSAY OF CK (CPK): CPT

## 2021-12-30 PROCEDURE — 85613 RUSSELL VIPER VENOM DILUTED: CPT

## 2021-12-30 PROCEDURE — 86803 HEPATITIS C AB TEST: CPT

## 2021-12-30 PROCEDURE — 86160 COMPLEMENT ANTIGEN: CPT

## 2021-12-30 PROCEDURE — 82043 UR ALBUMIN QUANTITATIVE: CPT

## 2021-12-30 PROCEDURE — 81001 URINALYSIS AUTO W/SCOPE: CPT

## 2021-12-30 PROCEDURE — 86147 CARDIOLIPIN ANTIBODY EA IG: CPT | Mod: 59

## 2021-12-30 PROCEDURE — 86256 FLUORESCENT ANTIBODY TITER: CPT

## 2021-12-30 PROCEDURE — 82164 ANGIOTENSIN I ENZYME TEST: CPT | Mod: 90

## 2021-12-30 PROCEDURE — 85730 THROMBOPLASTIN TIME PARTIAL: CPT

## 2021-12-30 PROCEDURE — 83874 ASSAY OF MYOGLOBIN: CPT

## 2021-12-30 PROCEDURE — 86255 FLUORESCENT ANTIBODY SCREEN: CPT

## 2021-12-30 PROCEDURE — 86225 DNA ANTIBODY NATIVE: CPT

## 2021-12-30 PROCEDURE — 82085 ASSAY OF ALDOLASE: CPT | Mod: 90

## 2021-12-30 PROCEDURE — 81374 HLA I TYPING 1 ANTIGEN LR: CPT

## 2021-12-30 PROCEDURE — 85652 RBC SED RATE AUTOMATED: CPT

## 2021-12-30 PROCEDURE — 86160 COMPLEMENT ANTIGEN: CPT | Mod: 59

## 2021-12-30 PROCEDURE — 86618 LYME DISEASE ANTIBODY: CPT

## 2021-12-30 PROCEDURE — 86140 C-REACTIVE PROTEIN: CPT

## 2021-12-30 PROCEDURE — 85390 FIBRINOLYSINS SCREEN I&R: CPT | Performed by: PATHOLOGY

## 2021-12-31 LAB
ACE SERPL-CCNC: 38 U/L
ALDOLASE SERPL-CCNC: 3.1 U/L
ANCA AB PATTERN SER IF-IMP: NORMAL
C-ANCA TITR SER IF: NORMAL {TITER}
C3 SERPL-MCNC: 118 MG/DL (ref 81–157)
C4 SERPL-MCNC: 33 MG/DL (ref 13–39)
CARDIOLIPIN IGA SER IA-ACNC: 2.3 APL-U/ML
CARDIOLIPIN IGA SER IA-ACNC: NEGATIVE
CARDIOLIPIN IGG SER IA-ACNC: <2 GPL-U/ML
CARDIOLIPIN IGG SER IA-ACNC: NEGATIVE
CARDIOLIPIN IGM SER IA-ACNC: 2 MPL-U/ML
CARDIOLIPIN IGM SER IA-ACNC: NEGATIVE
DRVVT SCREEN RATIO: 0.94
DSDNA AB SER-ACNC: 0.9 IU/ML
ENA JO1 AB SER IA-ACNC: <0.5 U/ML
ENA JO1 IGG SER-ACNC: NEGATIVE
ENA SM IGG SER IA-ACNC: <1.6 U/ML
ENA SM IGG SER IA-ACNC: NEGATIVE
ENA SS-A AB SER IA-ACNC: <0.5 U/ML
ENA SS-A AB SER IA-ACNC: <0.5 U/ML
ENA SS-A AB SER IA-ACNC: NEGATIVE
ENA SS-A AB SER IA-ACNC: NEGATIVE
ENA SS-B IGG SER IA-ACNC: <0.6 U/ML
ENA SS-B IGG SER IA-ACNC: <0.6 U/ML
ENA SS-B IGG SER IA-ACNC: NEGATIVE
ENA SS-B IGG SER IA-ACNC: NEGATIVE
INR PPP: 0.98 (ref 0.85–1.15)
LA PPP-IMP: NEGATIVE
LUPUS INTERPRETATION: NORMAL
MYOGLOBIN SERPL-MCNC: 29 UG/L
PTT RATIO: 0.95
THROMBIN TIME: 16.7 SECONDS (ref 13–19)
U1 SNRNP IGG SER IA-ACNC: 1.2 U/ML
U1 SNRNP IGG SER IA-ACNC: NEGATIVE

## 2022-01-04 LAB
B LOCUS: NORMAL
B27TEST METHOD: NORMAL

## 2022-01-18 DIAGNOSIS — M62.81 GENERALIZED MUSCLE WEAKNESS: ICD-10-CM

## 2022-01-18 DIAGNOSIS — R42 DIZZINESS: Primary | ICD-10-CM

## 2022-01-31 ENCOUNTER — THERAPY VISIT (OUTPATIENT)
Dept: SLEEP MEDICINE | Facility: CLINIC | Age: 37
End: 2022-01-31
Attending: PHYSICIAN ASSISTANT
Payer: COMMERCIAL

## 2022-01-31 DIAGNOSIS — Z72.820 LACK OF ADEQUATE SLEEP: ICD-10-CM

## 2022-01-31 DIAGNOSIS — R06.00 DYSPNEA AND RESPIRATORY ABNORMALITY: ICD-10-CM

## 2022-01-31 DIAGNOSIS — R06.83 SNORING: ICD-10-CM

## 2022-01-31 DIAGNOSIS — R40.0 DAYTIME SLEEPINESS: ICD-10-CM

## 2022-01-31 DIAGNOSIS — R53.83 FATIGUE, UNSPECIFIED TYPE: ICD-10-CM

## 2022-01-31 DIAGNOSIS — G47.30 SLEEP APNEA, UNSPECIFIED TYPE: ICD-10-CM

## 2022-01-31 DIAGNOSIS — R06.89 DYSPNEA AND RESPIRATORY ABNORMALITY: ICD-10-CM

## 2022-01-31 PROCEDURE — 95810 POLYSOM 6/> YRS 4/> PARAM: CPT | Performed by: INTERNAL MEDICINE

## 2022-02-01 ENCOUNTER — DOCUMENTATION ONLY (OUTPATIENT)
Dept: BEHAVIORAL HEALTH | Facility: HOSPITAL | Age: 37
End: 2022-02-01
Payer: COMMERCIAL

## 2022-02-01 NOTE — PROGRESS NOTES
Called the patient about an opening on 2/4. Patient was scheduled for her New visit on 3/07. Patient can make it on that day. Will complete the intake forms prior to the video visit.

## 2022-02-03 PROBLEM — E04.1 THYROID NODULE: Status: ACTIVE | Noted: 2021-08-18

## 2022-02-03 LAB — SLPCOMP: NORMAL

## 2022-02-03 NOTE — PROCEDURES
" SLEEP STUDY INTERPRETATION  DIAGNOSTIC POLYSOMNOGRAPHY REPORT      Patient: ALONSO OMALLEY  YOB: 1985  Study Date: 1/31/2022  MRN: [id number]  Referring Provider: IVANIA Dong CNP  Ordering Provider: Dmitri Vásquez    Indications for Polysomnography: The patient is a 36 year old Female who is 5' 2\" and weighs 133.0 lbs. Her BMI is 24.5, [place] sleepiness scale 11 and neck circumference is 33 cm. A diagnostic polysomnogram was performed to evaluate for loud snoring, elevated bicarbonate of 30, witnessed apnea, non-refreshing sleep, daytime sleepiness (ESS 11) and difficulty maintaining sleep.      Polysomnogram Data: A full night polysomnogram recorded the standard physiologic parameters including EEG, EOG, EMG, ECG, nasal and oral airflow. Respiratory parameters of chest and abdominal movements were recorded with respiratory inductance plethysmography. Oxygen saturation was recorded by pulse oximetry. Hypopnea scoring rule used: 1B 4%.    Sleep Architecture:   The total recording time of the polysomnogram was 430.0 minutes. The total sleep time was 373.0 minutes. Sleep latency was increased at 25.0 minutes without the use of a sleep aid. REM latency was 54.0 minutes. Arousal index was 24.3 arousals per hour. Sleep efficiency was normal at 86.7%. Wake after sleep onset was 32.0 minutes. The patient spent 1.9% of total sleep time in Stage N1, 61.9% in Stage N2, 25.6% in Stage N3, and 10.6% in REM. Time in REM supine was 16.5 minutes.    Respiration:     Events ? The polysomnogram revealed a presence of 0 obstructive, 2 central, and 1 mixed apneas resulting in an apnea index of 0.5 events per hour. There were 2 obstructive hypopneas and 0 central hypopneas resulting in an obstructive hypopnea index of 0.3 and central hypopnea index of 0 events per hour. The combined apnea/hypopnea index was 0.8 events per hour (central apnea/hypopnea index was 0.3 events per hour). The REM AHI was 0 " events per hour. The supine AHI was 1.9 events per hour. The RERA index was 4.0 events per hour.  The RDI was 4.8 events per hour.    Snoring - was reported as not heard    Respiratory rate and pattern - was notable for normal respiratory rate and pattern.    Sustained Sleep Associated Hypoventilation - Transcutaneous carbon dioxide monitoring was not used, however significant hypoventilation was not suggested by oximetry    Sleep Associated Hypoxemia - (Greater than 5 minutes O2 sat at or below 88%) was not present. Baseline oxygen saturation was 95.6%. Lowest oxygen saturation was 91.4%. Time spent less than or equal to 88% was 0 minutes. Time spent less than or equal to 89% was 0 minutes.    Movement Activity:     Periodic Limb Activity - There were 33 PLMs during the entire study. The PLM index was 5.3 movements per hour. The PLM Arousal Index was 1.0 per hour.    REM EMG Activity - Excessive transient/sustained muscle activity was not present.    Nocturnal Behavior - Abnormal sleep related behaviors were not noted     Bruxism - None apparent.    Cardiac Summary:   The average pulse rate was 59.8 bpm. The minimum pulse rate was 46.9 bpm while the maximum pulse rate was 94.0 bpm.  Arrhythmias were not noted.      Assessment:     No evidence of significant obstructive sleep apnea    Recommendations:    Advice regarding the risks of drowsy driving.    Suggest optimizing sleep schedule and avoiding sleep deprivation.    Pharmacologic therapy should be used for management of restless legs syndrome only if present and clinically indicated and not based on the presence of periodic limb movements alone.    Diagnostic Codes:   Fatigue R53.83  _____________________________________   Electronically Signed By: Jj Torres MD  2/3/22           Range(%) Time in range (min)   0.0 - 89.0 -   0.0 - 88.0 -         Stage Min(mm Hg) Max(mm Hg)   Wake - -   NREM(1+2+3) - -   REM - -       Range(mmHg) Time in range (min)   55.0 -  100.0 -   Excluded data <20.0 & >65.0 430.5

## 2022-02-04 ENCOUNTER — VIRTUAL VISIT (OUTPATIENT)
Dept: BEHAVIORAL HEALTH | Facility: HOSPITAL | Age: 37
End: 2022-02-04
Payer: COMMERCIAL

## 2022-02-04 ENCOUNTER — DOCUMENTATION ONLY (OUTPATIENT)
Dept: BEHAVIORAL HEALTH | Facility: HOSPITAL | Age: 37
End: 2022-02-04
Payer: COMMERCIAL

## 2022-02-04 DIAGNOSIS — F33.2 SEVERE EPISODE OF RECURRENT MAJOR DEPRESSIVE DISORDER, WITHOUT PSYCHOTIC FEATURES (H): Primary | ICD-10-CM

## 2022-02-04 DIAGNOSIS — F42.9 OBSESSIVE-COMPULSIVE DISORDER, UNSPECIFIED TYPE: ICD-10-CM

## 2022-02-04 DIAGNOSIS — F41.1 GAD (GENERALIZED ANXIETY DISORDER): ICD-10-CM

## 2022-02-04 PROCEDURE — 90834 PSYTX W PT 45 MINUTES: CPT | Mod: 95 | Performed by: SOCIAL WORKER

## 2022-02-04 ASSESSMENT — PATIENT HEALTH QUESTIONNAIRE - PHQ9
10. IF YOU CHECKED OFF ANY PROBLEMS, HOW DIFFICULT HAVE THESE PROBLEMS MADE IT FOR YOU TO DO YOUR WORK, TAKE CARE OF THINGS AT HOME, OR GET ALONG WITH OTHER PEOPLE: VERY DIFFICULT
SUM OF ALL RESPONSES TO PHQ QUESTIONS 1-9: 17
SUM OF ALL RESPONSES TO PHQ QUESTIONS 1-9: 17
5. POOR APPETITE OR OVEREATING: NEARLY EVERY DAY

## 2022-02-04 ASSESSMENT — ANXIETY QUESTIONNAIRES
3. WORRYING TOO MUCH ABOUT DIFFERENT THINGS: MORE THAN HALF THE DAYS
1. FEELING NERVOUS, ANXIOUS, OR ON EDGE: NEARLY EVERY DAY
GAD7 TOTAL SCORE: 14
IF YOU CHECKED OFF ANY PROBLEMS ON THIS QUESTIONNAIRE, HOW DIFFICULT HAVE THESE PROBLEMS MADE IT FOR YOU TO DO YOUR WORK, TAKE CARE OF THINGS AT HOME, OR GET ALONG WITH OTHER PEOPLE: EXTREMELY DIFFICULT
7. FEELING AFRAID AS IF SOMETHING AWFUL MIGHT HAPPEN: SEVERAL DAYS
6. BECOMING EASILY ANNOYED OR IRRITABLE: NEARLY EVERY DAY
5. BEING SO RESTLESS THAT IT IS HARD TO SIT STILL: NOT AT ALL
2. NOT BEING ABLE TO STOP OR CONTROL WORRYING: MORE THAN HALF THE DAYS

## 2022-02-04 NOTE — PROGRESS NOTES
[facility]   Mental Health & Addiction Services     Progress Note - Initial Visit    Patient  Name:  Leticia Lawson Date: 2022         Service Type: Individual     Visit Start Time: 8:03       Visit End Time: 8:55    Visit #: 1    Attendees: Client    Service Modality:  Video Visit:      Provider verified identity through the following two step process.  Patient provided:  Patient ; patient's middle name.    Telemedicine Visit: The patient's condition can be safely assessed and treated via synchronous audio and visual telemedicine encounter.      Reason for Telemedicine Visit: Services only offered telehealth    Originating Site (Patient Location): Patient's home    Distant Site (Provider Location): [facility]    Consent:  The patient/guardian has verbally consented to: the potential risks and benefits of telemedicine (video visit) versus in person care; bill my insurance or make self-payment for services provided; and responsibility for payment of non-covered services.     Patient would like the video invitation sent by:  My Chart    Mode of Communication:  Video Conference via Amwell    As the provider I attest to compliance with applicable laws and regulations related to telemedicine.    DATA:   Interactive Complexity: No   Crisis: No     Presenting Concerns/  Current Stressors: Patient was referred by her psychiatrist, Wendy Kellogg NP, with [facility] for psychotherapy. Patient has been dealing with many issues in her life. She recalls have been exposed to therapy for a different reason than what brought her in today. When she was only 8, parents  and she was put in therapy. Today she reports she never had counseling for the reported issues. She has been dealing with MDD since childhood and was first diagnosed at age [age]. She has been experiencing TORRI as well along way in her life. She  grew up with some issues that turned out to be OCD as diagnosed some 10 years ago. 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 She experiences uncontrollable fears of the unknown. Throws up whenever she watches a movie, show where someone does it. Growing up, if someone is sick, she too would get sick and cries out of fear. She reports being very sensitive to the sound, yells and get mad and angry for no apparent explanation. No concentration. She has been on medications for all these conditions and wishes to add therapy. She would like to get conscious control over these things. She would like to know how to respond to her feelings. Learn healthy coping skills. Patient also has medical issues that she does not know what they are yet. She is working with her providers to find what is causing pain in her body. Will continue with her assessment in 2 weeks.    ASSESSMENT:  Mental Status Assessment:  Appearance:   Appropriate   Eye Contact:   Good   Psychomotor Behavior: Normal   Attitude:   Cooperative   Orientation:   Person Place Time Situation  Speech   Rate / Production: Normal/ Responsive   Volume:  Normal   Mood:    Anxious  Depressed   Affect:    Appropriate   Thought Content:  Clear   Thought Form:  Coherent   Insight:    Good       Safety Issues and Plan for Safety and Risk Management:     [place] Suicide Severity Rating Scale (Short Version)  [place] Suicide Severity Rating (Short Version) 2/4/2022   Over the past 2 weeks have you felt down, depressed, or hopeless? no   Over the past 2 weeks have you had thoughts of killing yourself? no   Have you ever attempted to kill yourself? no     Patient denies current fears or concerns for personal safety.  Patient denies current or recent suicidal ideation or behaviors.  Patient denies current or recent homicidal ideation or behaviors.  Patient denies current or recent self injurious behavior or ideation.  Patient denies other safety  concerns.  Recommended that patient call 911 or go to the local ED should there be a change in any of these risk factors.  Patient reports there are no firearms in the house.     Diagnostic Criteria:  Generalized Anxiety Disorder  A. Excessive anxiety and worry about a number of events or activities (such as work or school performance).   B. The person finds it difficult to control the worry.   - Restlessness or feeling keyed up or on edge.    - Being easily fatigued.    - Difficulty concentrating or mind going blank.    - Irritability.   E. The anxiety, worry, or physical symptoms cause clinically significant distress or impairment in social, occupational, or other important areas of functioning.   F. The disturbance is not due to the direct physiological effects of a substance (e.g., a drug of abuse, a medication) or a general medical condition (e.g., hyperthyroidism) and does not occur exclusively during a Mood Disorder, a Psychotic Disorder, or a Pervasive Developmental Disorder.  Major Depressive Disorder   - Depressed mood. Note: In children and adolescents, can be irritable mood.     - Diminished interest or pleasure in all, or almost all, activities.    - Fatigue or loss of energy.    - Diminished ability to think or concentrate, or indecisiveness.   B) The symptoms cause clinically significant distress or impairment in social, occupational, or other important areas of functioning  D) The occurence of major depressive episode is not better explained by other thought / psychotic disorders  E) There has never been a manic episode or hypomanic episode    DSM5 Diagnoses: (Sustained by DSM5 Criteria Listed Above)  Diagnoses: 296.32 (F33.1) Major Depressive Disorder, Recurrent Episode, Moderate _ and With anxious distress  300.02 (F41.1) Generalized Anxiety Disorder  300.3 (F42) Obsessive Compulsive Disorder  Psychosocial & Contextual Factors:  Medical issues, mental health issues preventing her from functioning  properly and many aspects of life: personal, family, social, and work.  WHODAS 2.0 (12 item): No flowsheet data found.   PROMIS: 17  Intervention:   Mindfulness- Patient was educated on relaxation and mindfulness techniques; focusing on here and now. Practicing deep breathing. Taking some short walk as her body and weather permit.  Collateral Reports Completed:  Psychiatrist  PLAN: (Homework, other):  1. Provider will continue Diagnostic Assessment.  Patient was given the following to do until next session:  Tips for mindfulness, read and practice STAR TIPS tool, resume Yoga if your body allows and use meditation and deep breathing when feeling overwhelmed.     2. Provider recommended the following referrals: No referral discussed today. Any referral will be discussed with the patient before it is made.     3.  Suicide Risk and Safety Concerns were assessed for Leticia Lawson.    Patient meets the following risk assessment and triage: Patient denies any SI. reported she feels safe    ZAHRA Cruz  February 4, 2022      Answers for HPI/ROS submitted by the patient on 2/4/2022  If you checked off any problems, how difficult have these problems made it for you to do your work, take care of things at home, or get along with other people?: Very difficult  PHQ9 TOTAL SCORE: 17

## 2022-02-05 ASSESSMENT — ANXIETY QUESTIONNAIRES: GAD7 TOTAL SCORE: 14

## 2022-02-05 ASSESSMENT — PATIENT HEALTH QUESTIONNAIRE - PHQ9: SUM OF ALL RESPONSES TO PHQ QUESTIONS 1-9: 17

## 2022-02-16 ENCOUNTER — VIRTUAL VISIT (OUTPATIENT)
Dept: SLEEP MEDICINE | Facility: CLINIC | Age: 37
End: 2022-02-16
Payer: COMMERCIAL

## 2022-02-16 ENCOUNTER — MYC MEDICAL ADVICE (OUTPATIENT)
Dept: FAMILY MEDICINE | Facility: CLINIC | Age: 37
End: 2022-02-16

## 2022-02-16 VITALS — WEIGHT: 128 LBS | BODY MASS INDEX: 23.55 KG/M2 | HEIGHT: 62 IN

## 2022-02-16 DIAGNOSIS — R40.0 DAYTIME SLEEPINESS: Primary | ICD-10-CM

## 2022-02-16 PROCEDURE — 99213 OFFICE O/P EST LOW 20 MIN: CPT | Mod: 95 | Performed by: PHYSICIAN ASSISTANT

## 2022-02-16 ASSESSMENT — SLEEP AND FATIGUE QUESTIONNAIRES
HOW LIKELY ARE YOU TO NOD OFF OR FALL ASLEEP WHILE WATCHING TV: MODERATE CHANCE OF DOZING
HOW LIKELY ARE YOU TO NOD OFF OR FALL ASLEEP WHILE SITTING QUIETLY AFTER LUNCH WITHOUT ALCOHOL: MODERATE CHANCE OF DOZING
HOW LIKELY ARE YOU TO NOD OFF OR FALL ASLEEP WHILE SITTING AND READING: HIGH CHANCE OF DOZING
HOW LIKELY ARE YOU TO NOD OFF OR FALL ASLEEP WHILE SITTING INACTIVE IN A PUBLIC PLACE: SLIGHT CHANCE OF DOZING
HOW LIKELY ARE YOU TO NOD OFF OR FALL ASLEEP WHILE LYING DOWN TO REST IN THE AFTERNOON WHEN CIRCUMSTANCES PERMIT: HIGH CHANCE OF DOZING
HOW LIKELY ARE YOU TO NOD OFF OR FALL ASLEEP WHILE SITTING AND TALKING TO SOMEONE: SLIGHT CHANCE OF DOZING
HOW LIKELY ARE YOU TO NOD OFF OR FALL ASLEEP WHEN YOU ARE A PASSENGER IN A CAR FOR AN HOUR WITHOUT A BREAK: MODERATE CHANCE OF DOZING
HOW LIKELY ARE YOU TO NOD OFF OR FALL ASLEEP IN A CAR, WHILE STOPPED FOR A FEW MINUTES IN TRAFFIC: WOULD NEVER DOZE

## 2022-02-16 NOTE — PROGRESS NOTES
Sheree is a 36 year old who is being evaluated via a billable video visit.      Patient states did not have flu shot this year.    How would you like to obtain your AVS? MyChart  If the video visit is dropped, the invitation should be resent by: Send to e-mail at: [email or web address]  Will anyone else be joining your video visit? No      Video Start Time: 9:00 AM  Video-Visit Details    Type of service:  Video Visit    Video End Time:9:10 AM    Originating Location (pt. Location): Home    Distant Location (provider location):  [facility]     Platform used for Video Visit: [facility]       Sleep Study Follow-Up Visit:    Date on this visit: 2/16/2022    Leticia Lawson comes in today for follow-up of her sleep study done on 1/31/2022 at the [facility]. A diagnostic polysomnogram was performed to evaluate for loud snoring, elevated bicarbonate of 30, witnessed apnea, non-refreshing sleep, daytime sleepiness (ESS 11) and difficulty maintaining sleep.      Polysomnogram as interpreted by Dr Torres:    Sleep Architecture:   The total recording time of the polysomnogram was 430.0 minutes. The total sleep time was 373.0 minutes. Sleep latency was increased at 25.0 minutes without the use of a sleep aid. REM latency was 54.0 minutes. Arousal index was 24.3 arousals per hour. Sleep efficiency was normal at 86.7%. Wake after sleep onset was 32.0 minutes. The patient spent 1.9% of total sleep time in Stage N1, 61.9% in Stage N2, 25.6% in Stage N3, and 10.6% in REM. Time in REM supine was 16.5 minutes.     Respiration:   Events ? The polysomnogram revealed a presence of 0 obstructive, 2 central, and 1 mixed apneas resulting in an apnea index of 0.5 events per hour. There were 2 obstructive hypopneas and 0 central hypopneas resulting in an obstructive hypopnea index of 0.3 and central hypopnea index of 0 events per hour. The combined apnea/hypopnea index was 0.8 events per  "hour (central apnea/hypopnea index was 0.3 events per hour). The REM AHI was 0 events per hour. The supine AHI was 1.9 events per hour. The RERA index was 4.0 events per hour.  The RDI was 4.8 events per hour.  Snoring - was reported as not heard  Respiratory rate and pattern - was notable for normal respiratory rate and pattern.  Sustained Sleep Associated Hypoventilation - Transcutaneous carbon dioxide monitoring was not used, however significant hypoventilation was not suggested by oximetry  Sleep Associated Hypoxemia - (Greater than 5 minutes O2 sat at or below 88%) was not present. Baseline oxygen saturation was 95.6%. Lowest oxygen saturation was 91.4%. Time spent less than or equal to 88% was 0 minutes. Time spent less than or equal to 89% was 0 minutes.     Movement Activity:   Periodic Limb Activity - There were 33 PLMs during the entire study. The PLM index was 5.3 movements per hour. The PLM Arousal Index was 1.0 per hour.  REM EMG Activity - Excessive transient/sustained muscle activity was not present.  Nocturnal Behavior - Abnormal sleep related behaviors were not noted   Bruxism - None apparent.     Cardiac Summary:   The average pulse rate was 59.8 bpm. The minimum pulse rate was 46.9 bpm while the maximum pulse rate was 94.0 bpm.  Arrhythmias were not noted.     These findings were reviewed with patient.     Past medical/surgical history, family history, social history, medications and allergies were reviewed.      Problem List:  Patient Active Problem List    Diagnosis Date Noted     ASCUS of cervix with negative high risk HPV 08/27/2021     Priority: Medium     \"Did have abnormal in 20s. Had colposcopy that was normal. Then had 2 more paps that were abnormal, since those have all been normal since.\" 2021 2019 NIL Pap, Neg HPV  8/18/21 ASCUS pap, Neg HPV. Plan cotest in 3 years.       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 This was a good study that included time spent in REM sleep.     - Polysomnogram was reviewed in detail today  - Discussed extension of sleep time and reviewed methods.   - Patient counseled to get 8+ hours/per night and not to sleep in on the weekends.   - She will continue to optimize mental health treatment.   - If she continues to be sleepy, she will follow up and we will consider hypersomnia work up.     She will follow up with me as needed.     20 minutes spent on day of encounter doing chart review,  history and exam, counseling, coordinating plan of care, documentation and further activities as noted above.      Dmitri Vásquez PA-C

## 2022-02-16 NOTE — PATIENT INSTRUCTIONS
Your BMI is Body mass index is 23.41 kg/m .  Weight management is a personal decision.  If you are interested in exploring weight loss strategies, the following discussion covers the approaches that may be successful. Body mass index (BMI) is one way to tell whether you are at a healthy weight, overweight, or obese. It measures your weight in relation to your height.  A BMI of 18.5 to 24.9 is in the healthy range. A person with a BMI of 25 to 29.9 is considered overweight, and someone with a BMI of 30 or greater is considered obese. More than two-thirds of American adults are considered overweight or obese.  Being overweight or obese increases the risk for further weight gain. Excess weight may lead to heart disease and diabetes.  Creating and following plans for healthy eating and physical activity may help you improve your health.  Weight control is part of healthy lifestyle and includes exercise, emotional health, and healthy eating habits. Careful eating habits lifelong are the mainstay of weight control. Though there are significant health benefits from weight loss, long-term weight loss with diet alone may be very difficult to achieve- studies show long-term success with dietary management in less than 10% of people. Attaining a healthy weight may be especially difficult to achieve in those with severe obesity. In some cases, medications, devices and surgical management might be considered.  What can you do?  If you are overweight or obese and are interested in methods for weight loss, you should discuss this with your provider.     Consider reducing daily calorie intake by 500 calories.     Keep a food journal.     Avoiding skipping meals, consider cutting portions instead.    Diet combined with exercise helps maintain muscle while optimizing fat loss. Strength training is particularly important for building and maintaining muscle mass. Exercise helps reduce stress, increase energy, and improves fitness.  Increasing exercise without diet control, however, may not burn enough calories to loose weight.       Start walking three days a week 10-20 minutes at a time    Work towards walking thirty minutes five days a week     Eventually, increase the speed of your walking for 1-2 minutes at time    And look into health and wellness programs that may be available through your health insurance provider, employer, local community center, or [employer].

## 2022-02-18 ENCOUNTER — VIRTUAL VISIT (OUTPATIENT)
Dept: BEHAVIORAL HEALTH | Facility: HOSPITAL | Age: 37
End: 2022-02-18
Payer: COMMERCIAL

## 2022-02-18 DIAGNOSIS — F42.9 OBSESSIVE-COMPULSIVE DISORDER, UNSPECIFIED TYPE: ICD-10-CM

## 2022-02-18 DIAGNOSIS — F41.1 GAD (GENERALIZED ANXIETY DISORDER): ICD-10-CM

## 2022-02-18 DIAGNOSIS — F33.1 MAJOR DEPRESSIVE DISORDER, RECURRENT EPISODE, MODERATE (H): Primary | ICD-10-CM

## 2022-02-18 PROCEDURE — 90791 PSYCH DIAGNOSTIC EVALUATION: CPT | Mod: 95 | Performed by: SOCIAL WORKER
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
## 2022-02-25 ENCOUNTER — VIRTUAL VISIT (OUTPATIENT)
Dept: BEHAVIORAL HEALTH | Facility: HOSPITAL | Age: 37
End: 2022-02-25
Payer: COMMERCIAL

## 2022-02-25 DIAGNOSIS — F42.9 OBSESSIVE-COMPULSIVE DISORDER, UNSPECIFIED TYPE: Primary | ICD-10-CM

## 2022-02-25 DIAGNOSIS — F33.1 MAJOR DEPRESSIVE DISORDER, RECURRENT EPISODE, MODERATE (H): ICD-10-CM

## 2022-02-25 DIAGNOSIS — F41.1 GAD (GENERALIZED ANXIETY DISORDER): ICD-10-CM

## 2022-02-25 PROCEDURE — 90834 PSYTX W PT 45 MINUTES: CPT | Mod: GT | Performed by: SOCIAL WORKER

## 2022-02-25 ASSESSMENT — ANXIETY QUESTIONNAIRES
6. BECOMING EASILY ANNOYED OR IRRITABLE: NEARLY EVERY DAY
3. WORRYING TOO MUCH ABOUT DIFFERENT THINGS: MORE THAN HALF THE DAYS
5. BEING SO RESTLESS THAT IT IS HARD TO SIT STILL: NOT AT ALL
2. NOT BEING ABLE TO STOP OR CONTROL WORRYING: SEVERAL DAYS
GAD7 TOTAL SCORE: 11
IF YOU CHECKED OFF ANY PROBLEMS ON THIS QUESTIONNAIRE, HOW DIFFICULT HAVE THESE PROBLEMS MADE IT FOR YOU TO DO YOUR WORK, TAKE CARE OF THINGS AT HOME, OR GET ALONG WITH OTHER PEOPLE: SOMEWHAT DIFFICULT
1. FEELING NERVOUS, ANXIOUS, OR ON EDGE: MORE THAN HALF THE DAYS
7. FEELING AFRAID AS IF SOMETHING AWFUL MIGHT HAPPEN: SEVERAL DAYS

## 2022-02-25 ASSESSMENT — PATIENT HEALTH QUESTIONNAIRE - PHQ9
5. POOR APPETITE OR OVEREATING: MORE THAN HALF THE DAYS
SUM OF ALL RESPONSES TO PHQ QUESTIONS 1-9: 14

## 2022-02-25 NOTE — PROGRESS NOTES
Progress Note    Patient Name: Leticia Lawson  Date: 2/25/2022         Service Type: Individual      Session Start Time: 15:02  Session End Time: 15:54     Session Length: 52    Session #:  1 Therapy    Attendees: Client    Service Modality:  Video Visit:      Provider verified identity through the following two step process.  Patient provided:  Patient is known previously to provider    Telemedicine Visit: The patient's condition can be safely assessed and treated via synchronous audio and visual telemedicine encounter.      Reason for Telemedicine Visit: Services only offered telehealth    Originating Site (Patient Location): Patient's home    Distant Site (Provider Location): [facility]    Consent:  The patient/guardian has verbally consented to: the potential risks and benefits of telemedicine (video visit) versus in person care; bill my insurance or make self-payment for services provided; and responsibility for payment of non-covered services.     Patient would like the video invitation sent by:  My Chart    Mode of Communication:  Video Conference via Amwell    As the provider I attest to compliance with applicable laws and regulations related to telemedicine.    DATA  Interactive Complexity: No  Crisis: No        Progress Since Last Session (Related to Symptoms / Goals / Homework):   Symptoms: Improving : some improvement with mood due to med review    Homework: Achieved / completed to satisfaction: Did practice the STAR TIPS tool      Episode of Care Goals: Satisfactory progress - ACTION (Actively working towards change); Intervened by reinforcing change plan / affirming steps taken     Current / Ongoing Stressors and Concerns: Patient returned today for her first therapy session after completing her DA. Patient reports fatigue probably related to change in her daily responsibility. 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 Besides she has family responsibilities including being a mom of young kids. Patient has shared she was experiencing anger some times. This will be explored during the sessions. Patient had an opportunity to ask questions if any around her DA. A TP was discussed as well. It appears that a bi weekly visit is appropriate for now.      Treatment Objective(s) Addressed in This Session:   use thought-stopping strategy daily to reduce intrusive thoughts  Identify negative self-talk and behaviors: challenge core beliefs, myths, and actions.  Support will be provided to process some unrealistic/distorted thinking around the presented concerns so patient can challenge them and regain some balance and in her emotions.     Intervention:     Situation        Automatic Thoughts  Cognitive Distortions      Feelings        Behavior        Questioning Thoughts          DBT:  Understanding of the states of mind to find a balance and achieve  mood regulation. Understanding states of mind and how they complete each other to help with problem solving and mood regulation.  Motivational Interviewing: to ID concerns and develop changeable solutions     Assessments completed prior to visit: 1    The following assessments were completed by patient for this visit:  PHQ9:   PHQ-9 SCORE 8/18/2021 2/4/2022 2/25/2022   PHQ-9 Total Score MyChart - 17 (Moderately severe depression) -   PHQ-9 Total Score 13 17 14     GAD7:   TORRI-7 SCORE 8/18/2021 2/4/2022 2/25/2022   Total Score 10 14 11     CAGE-AID:   CAGE-AID Total Score 8/18/2021 2/4/2022   Total Score 0 0     PROMIS 10-Global Health (all questions and answers displayed):   PROMIS 10 2/4/2022   In general, would you say your health is: 1   In general, would you say your quality of life is: 3   In general, how would you rate your physical health? 1   In general, how would you rate your mental health, including your mood and your ability to think? 2   In general, how would  you rate your satisfaction with your social activities and relationships? 3   In general, please rate how well you carry out your usual social activities and roles. (This includes activities at home, at work and in your community, and responsibilities as a parent, child, spouse, employee, friend, etc.) 3   To what extent are you able to carry out your everyday physical activities such as walking, climbing stairs, carrying groceries, or moving a chair? 2   In the past 7 days, how often have you been bothered by emotional problems such as feeling anxious, depressed, or irritable? 4   In the past 7 days, how would you rate your fatigue on average? 4   In the past 7 days, how would you rate your pain on average, where 0 means no pain, and 10 means worst imaginable pain? 7   Global Mental Health Score 10   Global Physical Health Score 7   PROMIS TOTAL - SUBSCORES 17     [place] Suicide Severity Rating Scale (Short Version)  [place] Suicide Severity Rating (Short Version) 2/4/2022   Over the past 2 weeks have you felt down, depressed, or hopeless? no   Over the past 2 weeks have you had thoughts of killing yourself? no   Have you ever attempted to kill yourself? no       ASSESSMENT: Current Emotional / Mental Status (status of significant symptoms):   Risk status (Self / Other harm or suicidal ideation)   Patient denies current fears or concerns for personal safety.   Patient denies current or recent suicidal ideation or behaviors.   Patient denies current or recent homicidal ideation or behaviors.   Patient denies current or recent self injurious behavior or ideation.   Patient denies other safety concerns.   Patient reports there has been no change in risk factors since their last session.     Patient reports there has been no change in protective factors since their last session.     Recommended that patient call 911 or go to the local ED should there be a change in any of these risk  factors.     Appearance:   Appropriate    Eye Contact:   Good    Psychomotor Behavior: Normal    Attitude:   Cooperative    Orientation:   Person Place Time Situation   Speech    Rate / Production: Normal/ Responsive    Volume:  Normal    Mood:    Normal   Affect:    Appropriate    Thought Content:  Clear    Thought Form:  Coherent  Logical    Insight:    Good      Medication Review:   Changes to psychiatric medications, see updated Medication List in EPIC.  discussed this with her psychiatrist     Medication Compliance:   Yes     Changes in Health Issues:   None reported     Chemical Use Review:   Substance Use: Chemical use reviewed, no active concerns identified      Tobacco Use: No current tobacco use.      Diagnosis:  1. Obsessive-compulsive disorder, unspecified type    2. TORRI (generalized anxiety disorder)    3. Major depressive disorder, recurrent episode, moderate (H)      Collateral Reports Completed:   Routed note to PCP    PLAN: (Patient Tasks / Therapist Tasks / Other)  Patient to read the TP developed today  Patient to read and practice the skills sent via my chart  Patient's next visit is in 2 weeks.    ZAHRA Cruz   Provider Oversight:  Dr. Mckinley Rea  ___________________________________________________________________    Individual Treatment Plan    Patient's Name: Leticia Lawson  YOB: 1985    Date of Creation: 2/25/2022    Date Treatment Plan Last Reviewed/Revised:  2/25/2022    DSM5 Diagnoses: 296.32 (F33.1) Major Depressive Disorder, Recurrent Episode, Moderate _ and With mixed features, 300.02 (F41.1) Generalized Anxiety Disorder or 300.3 (F42) Obsessive Compulsive Disorder    Psychosocial / Contextual Factors:  MDD; TORRI, OCD, adjusting to new life here in [place]. Increased unexplained anger.    PROMIS (reviewed every 90 days): 17;  Completed on 2/04/2022     Referral / Collaboration:  Referral to another professional/service is not indicated at this  time..    Anticipated number of session for this episode of care: 12  Anticipation frequency of session: Biweekly  Anticipated Duration of each session: 38-52 minutes  Treatment plan will be reviewed in 90 days or when goals have been changed.      MeasurableTreatment Goal(s) related to diagnosis / functional impairment(s)  Goal 1:Patient will develop an understanding of how avoidance of the grief process may affect functioning in all areas of functioning.   I will know I've met my goal when I have developed alternative diversions and other coping mechanisms that are related to loss issues by the next review       Objective #A (Patient Action)                          Status: New - Date: 2/25/2022  Patient will talk to at least two others about losses and coping.  Intervention(s)              Therapist will provide some education on how to safely share feelings of loss with others.     Goal 2: Patient will develop healthy cognitive patterns and beliefs about self and the world that lead to alleviation and help prevent the relapse of depression symptoms.     I will know I've met my goal when my level of my depression is reduced from 4/4 to 3/4 or better by the next review.       Objective #A (Patient Action)                          Patient will identify at least 4 stressors which contribute to feelings of depression.  Status: New - Date: 2/25/2022     Intervention(s)  Therapist will teach distraction skills. including seff soothing with the 5 senses.     Objective #B  Patient will Identify negative self-talk and behaviors: challenge core beliefs, myths, and actions.  Status: New - Date: 2/25/2022  Intervention(s)  Therapist will Introduce CBT skills.     Objective #C  Patient will Increase interest, engagement, and pleasure in doing things.  Status: New - Date: 2/25/2022   Intervention(s)  Therapist will Encourage patient to share identify and share thoughts and feelings to increase self confidence .     Goal 3:  "Client will will stabilize anxiety level while increasing ability to function on a daily basis.     I will know I've met my goal when my anxiety is reduced from 4/4 to 3/4 or better by the next review       Objective #A (Client Action)                Client will Increase interest, engagement, and pleasure in doing things.  Status: New - Date: 2/25/2022     Intervention(s)  Therapist will provide educational materials on distraction activities.     Objective #B  Client will identify at least 4 fears / thoughts that contribute to feeling anxious.  Status: New - Date: 2/25/2022     Intervention(s)  Therapist will teach how to challenge negative thoughts using CBT skills including the 3 Cs.     Objective #C  Client will use thought-stopping strategy daily to reduce intrusive thoughts.  Status: New - Date: 2/25/2022     Intervention(s)              Therapist will Teach how to use CBT: 3 Cs to challenge the NTs as they  present.      Patient will reduce the frequency, intensity, and duration of obsession.     I will know I've met my goal when I am able to control my unwanted thoughts, images, or impulses that distress and/ interfere with my daily routine, job performance, or social relationship, at least at 60 % of the time by the next review\"     Objective #A (Patient Action)                          Status: New - Date: 2/25/2022     Patient will limit amount of time spent on repetitive or obsessive thoughts to  3 min or less minutes.     Intervention(s)  Therapist will teach emotional recognition/identification. related to behavior change.  Objective #B  Patient will use cognitive strategies identified in therapy to challenge anxious thoughts.                      Status: New - Date: 2/25/2022     Intervention(s)  Therapist will teach CBT skills to challenge ANTs related to obsession and compulsive behaviors .  Objective #C  Patient will use thought-stopping strategy daily to reduce intrusive thoughts.  Status: New - " "Date: 2/25/2022  Intervention(s)  Therapist will assist the patient Identify any distortions and emotions that affect patient's productivity.     Goal 4: Client will develop an awareness of angry thoughts, feelings, and actions, clarifying origins of, and learning alternatives to aggressive anger.    I will know I've met my goal when the intensity of anger is reduced from 4/4 to 3/4 or better by the next review\"    Objective #A (Client Action)    Status: New - Date: 2/25/2022   Client will use progressive relaxation exercise once in the morning and once in the evening to help relieve tension  practice deep diaphragm breathing once daily for at least 5 minutes to reduce anger / irritability and regain a calmer, more clear state of mind  use anger journal once daily to express any thought distortions or irrational beliefs that contribute to anger or irritation.  Intervention(s)  Therapist will assign homework  teach STAR TIPS, practice CBT skills to reduce anger.    Objective #B  Client will use progressive relaxation exercise once in the morning and once in the evening to help relieve tension.    Status: New - Date: 2/25/2022   Intervention(s)  Therapist will role-play conflict management.    Objective #C  Client will use anger journal once daily to express identified  thought distortions or irrational beliefs that contribute to anger or irritation.  Status: New - Date: 2/25/2022   Intervention(s)  Therapist will provide space for the patient to express her frustration and be able to receiive input to help alleviate her anger.    Patient has reviewed and agreed to the above plan.    Hany Cardoso, [facility]  February 25, 2022      "

## 2022-02-26 ASSESSMENT — ANXIETY QUESTIONNAIRES: GAD7 TOTAL SCORE: 11

## 2022-03-01 DIAGNOSIS — F42.9 OBSESSIVE-COMPULSIVE DISORDER, UNSPECIFIED TYPE: ICD-10-CM

## 2022-03-01 NOTE — TELEPHONE ENCOUNTER
Date of Last Office Visit: 11/8/22  Date of Next Office Visit: NONE - routing to scheduling  No shows since last visit: 0  Cancellations since last visit: 1/28/22 - Provider initiated    Medication requested: clomiPRAMINE (ANAFRANIL) 25 MG capsule Date last ordered: 1/24/22 Qty: 30 Refills: 0     Review of [place] ?: NA    Lapse in medication adherence greater than 5 days?: NO PRN  Medication refill request verified as identical to current order?: yes  Result of Last DAM, VPA, Li+ Level, CBC, or Carbamazepine Level (at or since last visit): N/A    Last visit treatment plan:    1.  Continue sertraline 50 mg daily  2.  [place] test kit to be mailed to your home to make future medication choices  3.  Consider changing to clomipramine for your OCD symptoms  4.  Consider deep TMS to manage her symptoms of OCD  5.  Consider talk therapy as an option to process coping skills to manage her anxiety       Per note 1/17/22      []Medication refilled per  Medication Refill in Ambulatory Care  policy.  [x]Medication unable to be refilled by RN due to criteria not met as indicated below:    []Eligibility - not seen in the last year   []Supervision - no future appointment   []Compliance - no shows, cancellations or lapse in therapy   []Verification - order discrepancy   []Controlled medication   [x]Medication not included in policy   []90-day supply request   []Other

## 2022-03-02 RX ORDER — CLOMIPRAMINE HYDROCHLORIDE 25 MG/1
25 CAPSULE ORAL AT BEDTIME
Qty: 30 CAPSULE | Refills: 1 | Status: SHIPPED | OUTPATIENT
Start: 2022-03-02 | End: 2022-03-30

## 2022-03-17 ENCOUNTER — VIRTUAL VISIT (OUTPATIENT)
Dept: BEHAVIORAL HEALTH | Facility: HOSPITAL | Age: 37
End: 2022-03-17
Payer: COMMERCIAL

## 2022-03-17 DIAGNOSIS — F33.1 MAJOR DEPRESSIVE DISORDER, RECURRENT EPISODE, MODERATE (H): Primary | ICD-10-CM

## 2022-03-17 PROCEDURE — 90834 PSYTX W PT 45 MINUTES: CPT | Mod: GT | Performed by: SOCIAL WORKER

## 2022-03-17 ASSESSMENT — PATIENT HEALTH QUESTIONNAIRE - PHQ9
10. IF YOU CHECKED OFF ANY PROBLEMS, HOW DIFFICULT HAVE THESE PROBLEMS MADE IT FOR YOU TO DO YOUR WORK, TAKE CARE OF THINGS AT HOME, OR GET ALONG WITH OTHER PEOPLE: VERY DIFFICULT
SUM OF ALL RESPONSES TO PHQ QUESTIONS 1-9: 11
SUM OF ALL RESPONSES TO PHQ QUESTIONS 1-9: 11

## 2022-03-18 ASSESSMENT — PATIENT HEALTH QUESTIONNAIRE - PHQ9: SUM OF ALL RESPONSES TO PHQ QUESTIONS 1-9: 11

## 2022-03-30 ENCOUNTER — OFFICE VISIT (OUTPATIENT)
Dept: FAMILY MEDICINE | Facility: CLINIC | Age: 37
End: 2022-03-30
Payer: COMMERCIAL

## 2022-03-30 ENCOUNTER — ANCILLARY PROCEDURE (OUTPATIENT)
Dept: CARDIOLOGY | Facility: CLINIC | Age: 37
End: 2022-03-30
Attending: NURSE PRACTITIONER
Payer: COMMERCIAL

## 2022-03-30 VITALS
HEART RATE: 80 BPM | DIASTOLIC BLOOD PRESSURE: 72 MMHG | TEMPERATURE: 98 F | OXYGEN SATURATION: 96 % | BODY MASS INDEX: 23.92 KG/M2 | WEIGHT: 130.8 LBS | SYSTOLIC BLOOD PRESSURE: 117 MMHG | RESPIRATION RATE: 18 BRPM

## 2022-03-30 DIAGNOSIS — R00.2 PALPITATIONS: Primary | ICD-10-CM

## 2022-03-30 DIAGNOSIS — M25.50 MULTIPLE JOINT PAIN: ICD-10-CM

## 2022-03-30 DIAGNOSIS — F42.9 OBSESSIVE-COMPULSIVE DISORDER, UNSPECIFIED TYPE: ICD-10-CM

## 2022-03-30 DIAGNOSIS — F33.2 SEVERE EPISODE OF RECURRENT MAJOR DEPRESSIVE DISORDER, WITHOUT PSYCHOTIC FEATURES (H): ICD-10-CM

## 2022-03-30 DIAGNOSIS — F41.1 GAD (GENERALIZED ANXIETY DISORDER): ICD-10-CM

## 2022-03-30 DIAGNOSIS — R00.2 PALPITATIONS: ICD-10-CM

## 2022-03-30 PROBLEM — R76.8 POSITIVE ANA (ANTINUCLEAR ANTIBODY): Status: ACTIVE | Noted: 2022-03-30

## 2022-03-30 PROCEDURE — 99215 OFFICE O/P EST HI 40 MIN: CPT | Performed by: NURSE PRACTITIONER

## 2022-03-30 PROCEDURE — 93000 ELECTROCARDIOGRAM COMPLETE: CPT | Performed by: NURSE PRACTITIONER

## 2022-03-30 RX ORDER — MELOXICAM 7.5 MG/1
TABLET ORAL
Qty: 60 TABLET | Refills: 2
Start: 2022-03-30

## 2022-03-30 ASSESSMENT — ENCOUNTER SYMPTOMS
COUGH: 0
ARTHRALGIAS: 0
ABDOMINAL PAIN: 0
RHINORRHEA: 0
WHEEZING: 0
DIZZINESS: 1
MYALGIAS: 1
SLEEP DISTURBANCE: 0
DIARRHEA: 0
ABDOMINAL DISTENTION: 0
DYSPHORIC MOOD: 1
LIGHT-HEADEDNESS: 0
NAUSEA: 0
NERVOUS/ANXIOUS: 0
NUMBNESS: 0
CHEST TIGHTNESS: 0
CONSTIPATION: 0
HEADACHES: 0
SHORTNESS OF BREATH: 0
VOMITING: 0
PALPITATIONS: 1
SORE THROAT: 0
FATIGUE: 0

## 2022-03-30 ASSESSMENT — ANXIETY QUESTIONNAIRES
5. BEING SO RESTLESS THAT IT IS HARD TO SIT STILL: NOT AT ALL
4. TROUBLE RELAXING: MORE THAN HALF THE DAYS
2. NOT BEING ABLE TO STOP OR CONTROL WORRYING: SEVERAL DAYS
7. FEELING AFRAID AS IF SOMETHING AWFUL MIGHT HAPPEN: NOT AT ALL
6. BECOMING EASILY ANNOYED OR IRRITABLE: MORE THAN HALF THE DAYS
GAD7 TOTAL SCORE: 7
1. FEELING NERVOUS, ANXIOUS, OR ON EDGE: SEVERAL DAYS
7. FEELING AFRAID AS IF SOMETHING AWFUL MIGHT HAPPEN: NOT AT ALL
3. WORRYING TOO MUCH ABOUT DIFFERENT THINGS: SEVERAL DAYS

## 2022-03-30 ASSESSMENT — PATIENT HEALTH QUESTIONNAIRE - PHQ9
10. IF YOU CHECKED OFF ANY PROBLEMS, HOW DIFFICULT HAVE THESE PROBLEMS MADE IT FOR YOU TO DO YOUR WORK, TAKE CARE OF THINGS AT HOME, OR GET ALONG WITH OTHER PEOPLE: VERY DIFFICULT
SUM OF ALL RESPONSES TO PHQ QUESTIONS 1-9: 9
SUM OF ALL RESPONSES TO PHQ QUESTIONS 1-9: 9

## 2022-03-30 NOTE — PROGRESS NOTES
Assessment & Plan     Obsessive-compulsive disorder, unspecified type  Short-term prescription sent of sertraline/Zoloft until can follow-up with new mental health provider  - sertraline (ZOLOFT) 50 MG tablet; Take 1 tablet (50 mg) by mouth daily    Severe episode of recurrent major depressive disorder, without psychotic features (H)  Short-term prescription of sertraline/Zoloft sent until can follow-up with new mental health provider  - sertraline (ZOLOFT) 50 MG tablet; Take 1 tablet (50 mg) by mouth daily    TORRI (generalized anxiety disorder)  Short-term prescription sent of sertraline/Zoloft until can follow-up with new mental health provider  - sertraline (ZOLOFT) 50 MG tablet; Take 1 tablet (50 mg) by mouth daily    Multiple joint pain  Previous rheumatology notes, imaging and labs reviewed.  Plan to decrease meloxicam to quarter tab twice per day.  Continue to follow with pain management.  Unfortunately, is unable to afford physical therapy.  Has been doing yoga at home.  - meloxicam (MOBIC) 7.5 MG tablet; Take 1/4 tab twice per day    Palpitations  EKG unchanged.  We will set up for echo and Zio patch.  Full plan will depend on outcome.  Educated regarding warning signs to watch for and when would need to seek immediate medical attention.  - Leadless EKG Monitor 8 to 14 Days; Future  - Echocardiogram Complete; Future  - EKG 12-lead complete w/read - Clinics    Review of external notes as documented elsewhere in note  Review of the result(s) of each unique test - labs, imaging   Prescription drug management  44 minutes spent on the date of the encounter doing chart review, history and exam, documentation and further activities per the note       No follow-ups on file.    IVANIA Dong [facility] QUEENIE Bentley is a 36 year old who presents for the following health issues       She is seeing a psychiatrist but states she would still like her Zoloft refilled by   [facility].   Her next visit with psychiatrist is 4/22/22 through [facility]     History of Present Illness       Back Pain:  She presents for follow up of back pain. Patient's back pain is a chronic problem.  Location of back pain:  Right lower back, left lower back, right hip and left hip  Description of back pain: cramping, dull ache, sharp and shooting  Back pain spreads: right buttocks, left buttocks, right knee, left knee, right shoulder and left shoulder    Since patient first noticed back pain, pain is: gradually worsening  Does back pain interfere with her job:  Yes      Mental Health Follow-up:  Patient presents to follow-up on Depression & Anxiety.Patient's depression since last visit has been:  No change  The patient is not having other symptoms associated with depression.  Patient's anxiety since last visit has been:  Good  The patient is not having other symptoms associated with anxiety.  Any significant life events: financial concerns and health concerns  Patient is not feeling anxious or having panic attacks.  Patient has no concerns about alcohol or drug use.       Today's PHQ-9         PHQ-9 Total Score: 9  PHQ-9 Q9 Thoughts of better off dead/self-harm past 2 weeks :   (P) Not at all    How difficult have these problems made it for you to do your work, take care of things at home, or get along with other people: Very difficult    Today's TORRI-7 Score: 7    Reason for visit:  Follow up pain/weakness, refill psych rx  Symptom onset:  More than a month  Symptoms include:  Pain, weakness, palpitations, shaking  Symptom intensity:  Moderate  Symptom progression:  Worsening  Had these symptoms before:  No  What makes it worse:  Stairs, standing for more than a few minutes, walking  What makes it better:  Not moving    She eats 0-1 servings of fruits and vegetables daily.She consumes 3 sweetened beverage(s) daily.She exercises with enough effort to increase her heart rate 9 or less minutes per day.   She exercises with enough effort to increase her heart rate 3 or less days per week.   She is taking medications regularly.       Here today for follow-up on multiple concerns.  Has been following with mental health.  Reports last 2 scheduled appointments were canceled.  Had a medication change that did not tolerate.  Went back to taking sertraline/Zoloft.  Feels much better while is taking that.  Did schedule appointment on April 22 with new mental health provider at [facility].  Is going to therapy once every 2 weeks.      Met with rheumatologist.  Additional labs were unremarkable.  Was started on meloxicam.  Had been taking it routinely but caused extreme fatigue.  Is not currently taking it.  Does feel like when was taking it it did help to decrease the pain.  Overall, feels like pain is getting gradually worse.  Has been considering buying a wheelchair so that can go to store with kids.  Had sleep study that was unremarkable.  Does have neurology appointment scheduled for April 18.  Did seek out own pain management clinic.  [facility]  Had lumbar MRI yesterday once that is returned will be having a medial branch block.  Unsure if has fibromyalgia.  Pain management also started on low-dose Narcan-has not started it.  Did get prescription for medical cannabis but has not started it.  Has used THC patches in the past and those were very effective.  Would like to use those instead of other forms of cannabis.  Is not able to go to physical therapy-cannot afford those visits.  Has been doing yoga at home.    Whenever stands up will be very lightheaded and will have palpitations. Goes up stairs , O2 sat 94%. Will lay down and HR will be elevated for about 10 minutes. Will feel fluttering pounding in throat. Feels like heartbeats are higher than then should be. Will feel like heart is flopping all over to begin with then will feel like it is a steady rhythm.  No appitiite. Is  not hungry. Will eat a couple bites and then will feel full. Bowels are not regular.      Review of Systems   Constitutional: Negative for fatigue.   HENT: Negative for ear pain, rhinorrhea and sore throat.    Eyes: Negative for visual disturbance.   Respiratory: Negative for cough, chest tightness, shortness of breath and wheezing.    Cardiovascular: Positive for palpitations. Negative for chest pain.   Gastrointestinal: Negative for abdominal distention, abdominal pain, constipation, diarrhea, nausea and vomiting.   Endocrine: Negative for cold intolerance and heat intolerance.   Musculoskeletal: Positive for myalgias. Negative for arthralgias.   Skin: Negative for rash.   Neurological: Positive for dizziness. Negative for light-headedness, numbness and headaches.   Psychiatric/Behavioral: Positive for dysphoric mood and mood changes. Negative for sleep disturbance. The patient is not nervous/anxious.           Objective    /72   Pulse 80   Temp 98  F (36.7  C) (Tympanic)   Resp 18   Wt 59.3 kg (130 lb 12.8 oz)   LMP 03/12/2022   SpO2 96%   Breastfeeding No   BMI 23.92 kg/m    Body mass index is 23.92 kg/m .  Physical Exam  Constitutional:       Appearance: Normal appearance. She is well-developed.   HENT:      Head: Normocephalic and atraumatic.      Right Ear: Tympanic membrane and external ear normal. No middle ear effusion.      Left Ear: Tympanic membrane and external ear normal.  No middle ear effusion.      Nose: No mucosal edema.   Eyes:      Pupils: Pupils are equal, round, and reactive to light.   Neck:      Thyroid: No thyromegaly.      Vascular: No carotid bruit.   Cardiovascular:      Rate and Rhythm: Normal rate and regular rhythm.      Heart sounds: Normal heart sounds.   Pulmonary:      Effort: Pulmonary effort is normal.      Breath sounds: Normal breath sounds.   Abdominal:      General: Bowel sounds are normal.      Palpations: Abdomen is soft.   Skin:     General: Skin is warm and  dry.   Neurological:      Mental Status: She is alert.      Motor: No weakness.   Psychiatric:         Behavior: Behavior normal.

## 2022-03-30 NOTE — PROGRESS NOTES
Patient was given brochure, Self-Placement Instruction, ZIO box with ZIO and LOG book. Ana Prieto, CMA

## 2022-03-30 NOTE — PATIENT INSTRUCTIONS
Please talk with your mental health provider about possibly starting cymbalta to help with mental health and also chronic pain.

## 2022-03-31 ENCOUNTER — VIRTUAL VISIT (OUTPATIENT)
Dept: BEHAVIORAL HEALTH | Facility: HOSPITAL | Age: 37
End: 2022-03-31
Payer: COMMERCIAL

## 2022-03-31 DIAGNOSIS — F42.9 OBSESSIVE-COMPULSIVE DISORDER, UNSPECIFIED TYPE: Primary | ICD-10-CM

## 2022-03-31 PROCEDURE — 90834 PSYTX W PT 45 MINUTES: CPT | Mod: GT | Performed by: SOCIAL WORKER

## 2022-03-31 ASSESSMENT — PATIENT HEALTH QUESTIONNAIRE - PHQ9: SUM OF ALL RESPONSES TO PHQ QUESTIONS 1-9: 9

## 2022-03-31 ASSESSMENT — ANXIETY QUESTIONNAIRES: GAD7 TOTAL SCORE: 7

## 2022-03-31 NOTE — PROGRESS NOTES
Progress Note    Patient Name: Leticia Lawson  Date: 3/31/2022         Service Type: Individual      Session Start Time: 14:04  Session End Time:14:54     Session Length:50    Session #:  3    Attendees: Client    Service Modality:  Video Visit:      Provider verified identity through the following two step process.  Patient provided:  Patient is known previously to provider    Telemedicine Visit: The patient's condition can be safely assessed and treated via synchronous audio and visual telemedicine encounter.      Reason for Telemedicine Visit: Services only offered telehealth    Originating Site (Patient Location): Patient's home    Distant Site (Provider Location): Provider Remote Setting    Consent:  The patient/guardian has verbally consented to: the potential risks and benefits of telemedicine (video visit) versus in person care; bill my insurance or make self-payment for services provided; and responsibility for payment of non-covered services.     Patient would like the video invitation sent by:  My Chart    Mode of Communication:  Video Conference via [employer]    As the provider I attest to compliance with applicable laws and regulations related to telemedicine.    DATA  Interactive Complexity: No  Crisis: No      Progress Since Last Session (Related to Symptoms / Goals / Homework):   Symptoms: Noted less difficulties this week.     Homework: Achieved / completed to satisfaction: Did practice the STAR TIPS tool; did some meaningful and enjoyable activity: Yoga      Episode of Care Goals: Satisfactory progress - ACTION (Actively working towards change); Intervened by reinforcing change plan / affirming steps taken     Current / Ongoing Stressors and Concerns: Patient noted a better week. She noted that the week seemed going to fast which shows how she has been productive. Her medications are currently being refilled by her PCP until she sees her new psychiatrist. Has moved to  [facility]. Patient did something she was proud of: went to Yoga class. Wishes to go more once she figures out the expense part. She might alternate with online Yoga and involve kids to motivate her. She might also find information around [employer] which is family friendly. 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 Will try to resource being sent via [employer].  Her next visit is in weeks.      Treatment Objective(s) Addressed in This Session:   identify three distraction and diversion activities and use those activities to decrease level of anxiety    Identify negative self-talk and behaviors: challenge core beliefs, myths, and actions.  Support will be provided to process some unrealistic/distorted thinking around the presented concerns so patient can challenge them and regain some balance and in her emotions.     Intervention:  Using CBT perspective on how our thoughts influence feelings and behaviors. Reviewed connection between patient's current issues and how they all affect her daily life.Developed common understanding of the importance of not giving up and not to expect huge improvement over night but progressively. The uncomfortable today can be bring much more relief tomorrow.   Encouragement was provided to continue challenging any automatic thought as they present.      DBT:  Understanding of the states of mind to find a balance and achieve  mood regulation. Understanding states of mind and how they complete each other to help with problem solving and mood regulation.  Motivational Interviewing: to ID concerns and develop changeable solutions     Assessments completed prior to visit: 2/18/2022    The following assessments were completed by patient for this visit:  PHQ9:   PHQ-9 SCORE 8/18/2021 2/4/2022 2/25/2022 3/17/2022 3/30/2022 3/30/2022   PHQ-9 Total Score [facility] - 17 (Moderately severe depression) - 11 (Moderate depression) - 9 (Mild  depression)   PHQ-9 Total Score 13 17 14 11 9 9     GAD7:   TORRI-7 SCORE 8/18/2021 2/4/2022 2/25/2022 3/30/2022 3/30/2022   Total Score - - - - 7 (mild anxiety)   Total Score 10 14 11 7 7     CAGE-AID:   CAGE-AID Total Score 8/18/2021 2/4/2022   Total Score 0 0     PROMIS 10-Global Health (all questions and answers displayed):   PROMIS 10 2/4/2022   In general, would you say your health is: 1   In general, would you say your quality of life is: 3   In general, how would you rate your physical health? 1   In general, how would you rate your mental health, including your mood and your ability to think? 2   In general, how would you rate your satisfaction with your social activities and relationships? 3   In general, please rate how well you carry out your usual social activities and roles. (This includes activities at home, at work and in your community, and responsibilities as a parent, child, spouse, employee, friend, etc.) 3   To what extent are you able to carry out your everyday physical activities such as walking, climbing stairs, carrying groceries, or moving a chair? 2   In the past 7 days, how often have you been bothered by emotional problems such as feeling anxious, depressed, or irritable? 4   In the past 7 days, how would you rate your fatigue on average? 4   In the past 7 days, how would you rate your pain on average, where 0 means no pain, and 10 means worst imaginable pain? 7   Global Mental Health Score 10   Global Physical Health Score 7   PROMIS TOTAL - SUBSCORES 17     [place] Suicide Severity Rating Scale (Short Version)  [place] Suicide Severity Rating (Short Version) 2/4/2022   Over the past 2 weeks have you felt down, depressed, or hopeless? no   Over the past 2 weeks have you had thoughts of killing yourself? no   Have you ever attempted to kill yourself? no       ASSESSMENT: Current Emotional / Mental Status (status of significant symptoms):   Risk status (Self / Other harm or suicidal  ideation)   Patient denies current fears or concerns for personal safety.   Patient denies current or recent suicidal ideation or behaviors.   Patient denies current or recent homicidal ideation or behaviors.   Patient denies current or recent self injurious behavior or ideation.   Patient denies other safety concerns.   Patient reports there has been no change in risk factors since their last session.     Patient reports there has been no change in protective factors since their last session.     Recommended that patient call 911 or go to the local ED should there be a change in any of these risk factors.     Appearance:   Appropriate    Eye Contact:   Good    Psychomotor Behavior: Normal    Attitude:   Cooperative    Orientation:   Person Place Time Situation   Speech    Rate / Production: Normal/ Responsive    Volume:  Normal    Mood:    Normal   Affect:    Appropriate    Thought Content:  Clear    Thought Form:  Coherent  Logical    Insight:    Good      Medication Review:   Changes to psychiatric medications, see updated Medication List in EPIC.     She has seen her PCP for medications and has an appt to see a new Psychiatrist with [facility] in 2 weeks.      Medication Compliance:   Yes     Allergies   Allergen Reactions     Sulfa Drugs       Changes in Health Issues:   None reported     Chemical Use Review:   Substance Use: Chemical use reviewed, no active concerns identified      Tobacco Use: No current tobacco use.      Diagnosis:  1. Obsessive-compulsive disorder, unspecified type      Collateral Reports Completed:   Routed note to PCP    PLAN: (Patient Tasks / Therapist Tasks / Other): reviewed today 3/31/2022  Patient will continue to identify at least one pleasurable/meaningful activity to do by herself or someone of her choice.  Patient will continue to refer to her TP as needed.   Patient will continue to review and practice the CBT skills sent via my chart  Patient will take some walks  every other day for at least 30 minutes each time.   Patient will pick a handson activity including journaling using GLAD  Patient will watch the U Tube on Dermatillomania being sent via my chart.   Patient's next visit is in 2 weeks.    [facility] Cardoso [facility]   Provider Oversight:  Dr. Mckinley Rea  ___________________________________________________________________    Individual Treatment Plan    Patient's Name: Leticia Lawson  YOB: 1985    Date of Creation: 2/25/2022    Date Treatment Plan Last Reviewed/Revised:  2/25/2022    DSM5 Diagnoses: 296.32 (F33.1) Major Depressive Disorder, Recurrent Episode, Moderate _ and With mixed features, 300.02 (F41.1) Generalized Anxiety Disorder or 300.3 (F42) Obsessive Compulsive Disorder    Psychosocial / Contextual Factors:  MDD; TORRI, OCD, adjusting to new life here in [place]. Increased unexplained anger.    PROMIS (reviewed every 90 days): 17;  Completed on 2/04/2022     Referral / Collaboration:  Referral to another professional/service is not indicated at this time..    Anticipated number of session for this episode of care: 12  Anticipation frequency of session: Biweekly  Anticipated Duration of each session: 38-52 minutes  Treatment plan will be reviewed in 90 days or when goals have been changed.      MeasurableTreatment Goal(s) related to diagnosis / functional impairment(s)  Goal 1:Patient will develop an understanding of how avoidance of the grief process may affect functioning in all areas of functioning.   I will know I've met my goal when I have developed alternative diversions and other coping mechanisms that are related to loss issues by the next review       Objective #A (Patient Action)                          Status: New - Date: 2/25/2022  Patient will talk to at least two others about losses and coping.  Intervention(s)              Therapist will provide some education on how to safely share feelings of loss with others.     Goal 2: Patient  will develop healthy cognitive patterns and beliefs about self and the world that lead to alleviation and help prevent the relapse of depression symptoms.     I will know I've met my goal when my level of my depression is reduced from 4/4 to 3/4 or better by the next review.       Objective #A (Patient Action)                          Patient will identify at least 4 stressors which contribute to feelings of depression.  Status: New - Date: 2/25/2022     Intervention(s)  Therapist will teach distraction skills. including seff soothing with the 5 senses.     Objective #B  Patient will Identify negative self-talk and behaviors: challenge core beliefs, myths, and actions.  Status: New - Date: 2/25/2022  Intervention(s)  Therapist will Introduce CBT skills.     Objective #C  Patient will Increase interest, engagement, and pleasure in doing things.  Status: New - Date: 2/25/2022   Intervention(s)  Therapist will Encourage patient to share identify and share thoughts and feelings to increase self confidence .     Goal 3: Client will will stabilize anxiety level while increasing ability to function on a daily basis.     I will know I've met my goal when my anxiety is reduced from 4/4 to 3/4 or better by the next review       Objective #A (Client Action)                Client will Increase interest, engagement, and pleasure in doing things.  Status: New - Date: 2/25/2022     Intervention(s)  Therapist will provide educational materials on distraction activities.     Objective #B  Client will identify at least 4 fears / thoughts that contribute to feeling anxious.  Status: New - Date: 2/25/2022     Intervention(s)  Therapist will teach how to challenge negative thoughts using CBT skills including the 3 Cs.     Objective #C  Client will use thought-stopping strategy daily to reduce intrusive thoughts.  Status: New - Date: 2/25/2022     Intervention(s)              Therapist will Teach how to use CBT: 3 Cs to challenge the NTs  "as they  present.      Patient will reduce the frequency, intensity, and duration of obsession.     I will know I've met my goal when I am able to control my unwanted thoughts, images, or impulses that distress and/ interfere with my daily routine, job performance, or social relationship, at least at 60 % of the time by the next review\"     Objective #A (Patient Action)                          Status: New - Date: 2/25/2022     Patient will limit amount of time spent on repetitive or obsessive thoughts to  3 min or less minutes.     Intervention(s)  Therapist will teach emotional recognition/identification. related to behavior change.  Objective #B  Patient will use cognitive strategies identified in therapy to challenge anxious thoughts.                      Status: New - Date: 2/25/2022     Intervention(s)  Therapist will teach CBT skills to challenge ANTs related to obsession and compulsive behaviors .  Objective #C  Patient will use thought-stopping strategy daily to reduce intrusive thoughts.  Status: New - Date: 2/25/2022  Intervention(s)  Therapist will assist the patient Identify any distortions and emotions that affect patient's productivity.     Goal 4: Client will develop an awareness of angry thoughts, feelings, and actions, clarifying origins of, and learning alternatives to aggressive anger.    I will know I've met my goal when the intensity of anger is reduced from 4/4 to 3/4 or better by the next review\"    Objective #A (Client Action)    Status: New - Date: 2/25/2022   Client will use progressive relaxation exercise once in the morning and once in the evening to help relieve tension  practice deep diaphragm breathing once daily for at least 5 minutes to reduce anger / irritability and regain a calmer, more clear state of mind  use anger journal once daily to express any thought distortions or irrational beliefs that contribute to anger or irritation.  Intervention(s)  Therapist will assign homework  " teach STAR TIPS, practice CBT skills to reduce anger.    Objective #B  Client will use progressive relaxation exercise once in the morning and once in the evening to help relieve tension.    Status: New - Date: 2/25/2022   Intervention(s)  Therapist will role-play conflict management.    Objective #C  Client will use anger journal once daily to express identified  thought distortions or irrational beliefs that contribute to anger or irritation.  Status: New - Date: 2/25/2022   Intervention(s)  Therapist will provide space for the patient to express her frustration and be able to receiive input to help alleviate her anger.    Patient has reviewed and agreed to the above plan.    ZAHRA Cruz  February 25, 2022    Answers for HPI/ROS submitted by the patient on 3/17/2022  If you checked off any problems, how difficult have these problems made it for you to do your work, take care of things at home, or get along with other people?: Very difficult  PHQ9 TOTAL SCORE: 11

## 2022-04-14 ENCOUNTER — VIRTUAL VISIT (OUTPATIENT)
Dept: BEHAVIORAL HEALTH | Facility: HOSPITAL | Age: 37
End: 2022-04-14
Payer: COMMERCIAL

## 2022-04-14 DIAGNOSIS — F33.1 MAJOR DEPRESSIVE DISORDER, RECURRENT EPISODE, MODERATE (H): Primary | ICD-10-CM

## 2022-04-14 PROCEDURE — 90834 PSYTX W PT 45 MINUTES: CPT | Mod: GT | Performed by: SOCIAL WORKER

## 2022-04-14 NOTE — PROGRESS NOTES
Progress Note    Patient Name: Leticia Lawson  Date:4/14/2022         Service Type: Individual      Session Start Time: 14:04  Session End Time:14:54     Session Length:50    Session #:  4    Attendees: Client    Service Modality:  Video Visit:      Provider verified identity through the following two step process.  Patient provided:  Patient is known previously to provider    Telemedicine Visit: The patient's condition can be safely assessed and treated via synchronous audio and visual telemedicine encounter.      Reason for Telemedicine Visit: Services only offered telehealth    Originating Site (Patient Location): Patient's home    Distant Site (Provider Location): Provider Remote Setting    Consent:  The patient/guardian has verbally consented to: the potential risks and benefits of telemedicine (video visit) versus in person care; bill my insurance or make self-payment for services provided; and responsibility for payment of non-covered services.     Patient would like the video invitation sent by:  My Chart    Mode of Communication:  Video Conference via [employer]well    As the provider I attest to compliance with applicable laws and regulations related to telemedicine.    DATA  Interactive Complexity: No  Crisis: No      Progress Since Last Session (Related to Symptoms / Goals / Homework):   Symptoms: feelings tired today and low motivation    Homework: Achieved / completed to satisfaction: Did practice the STAR TIPS tool; did some meaningful and enjoyable activity: Yoga      Episode of Care Goals: Satisfactory progress - ACTION (Actively working towards change); Intervened by reinforcing change plan / affirming steps taken     Current / Ongoing Stressors and Concerns: Patient reported feeling tired which also felt is much more of lack of motivation. Noted many things she wishes to do but has no energy to get to. Children won't help and they do not do well with consequences. Has to  do laundry for 8 people including herself. Has to cook for them unless the food is ordered. Has to clean everywhere and  their mess. This alone sucks her energy. So she finds herself giving up and staying on the couch.  Patient also notes some kids are willing to help. She can get some help from the 8; 10;11 and sometime the16 year old. She will try to engage the younger ones with some reward.  She notes no participation from  the 19 old and 16 year old. So the consequences are not motivating them. Patient will try the rule of the house. Might make some with the younger kids and reinforce them together. Her next visit is in weeks.      Treatment Objective(s) Addressed in This Session:   Decrease frequency and intensity of feeling down, depressed, hopeless  Identify negative self-talk and behaviors: challenge core beliefs, myths, and actions.  Support will be provided to process some unrealistic/distorted thinking around the presented concerns so patient can challenge them and regain some balance and in her emotions.     Intervention:  Did a review on CBT perspective on how our thoughts influence feelings and behaviors. Reviewed connection between patient's current issues and how they all affect her daily life.Developed common understanding of the importance of not giving up and not to expect huge improvement over night but progressively. The uncomfortable today can be bring much more relief tomorrow. Provided practical ergonomic tips to enhance productivity while working home and feel good at the end of the day.   Encouragement was provided to continue challenging any automatic thought as they present.      DBT:  Understanding of the states of mind to find a balance and achieve  mood regulation. Understanding states of mind and how they complete each other to help with problem solving and mood regulation.  Motivational Interviewing: to ID concerns and develop changeable solutions     Assessments completed  prior to visit: 2/18/2022    The following assessments were completed by patient for this visit:  PHQ9:   PHQ-9 SCORE 8/18/2021 2/4/2022 2/25/2022 3/17/2022 3/30/2022 3/30/2022   PHQ-9 Total Score MyChart - 17 (Moderately severe depression) - 11 (Moderate depression) - 9 (Mild depression)   PHQ-9 Total Score 13 17 14 11 9 9     GAD7:   TORRI-7 SCORE 8/18/2021 2/4/2022 2/25/2022 3/30/2022 3/30/2022   Total Score - - - - 7 (mild anxiety)   Total Score 10 14 11 7 7     CAGE-AID:   CAGE-AID Total Score 8/18/2021 2/4/2022   Total Score 0 0     PROMIS 10-Global Health (all questions and answers displayed):   PROMIS 10 2/4/2022   In general, would you say your health is: 1   In general, would you say your quality of life is: 3   In general, how would you rate your physical health? 1   In general, how would you rate your mental health, including your mood and your ability to think? 2   In general, how would you rate your satisfaction with your social activities and relationships? 3   In general, please rate how well you carry out your usual social activities and roles. (This includes activities at home, at work and in your community, and responsibilities as a parent, child, spouse, employee, friend, etc.) 3   To what extent are you able to carry out your everyday physical activities such as walking, climbing stairs, carrying groceries, or moving a chair? 2   In the past 7 days, how often have you been bothered by emotional problems such as feeling anxious, depressed, or irritable? 4   In the past 7 days, how would you rate your fatigue on average? 4   In the past 7 days, how would you rate your pain on average, where 0 means no pain, and 10 means worst imaginable pain? 7   Global Mental Health Score 10   Global Physical Health Score 7   PROMIS TOTAL - SUBSCORES 17     [place] Suicide Severity Rating Scale (Short Version)  [place] Suicide Severity Rating (Short Version) 2/4/2022   Over the past 2 weeks have you felt down,  depressed, or hopeless? no   Over the past 2 weeks have you had thoughts of killing yourself? no   Have you ever attempted to kill yourself? no       ASSESSMENT: Current Emotional / Mental Status (status of significant symptoms):   Risk status (Self / Other harm or suicidal ideation)   Patient denies current fears or concerns for personal safety.   Patient denies current or recent suicidal ideation or behaviors.   Patient denies current or recent homicidal ideation or behaviors.   Patient denies current or recent self injurious behavior or ideation.   Patient denies other safety concerns.   Patient reports there has been no change in risk factors since their last session.     Patient reports there has been no change in protective factors since their last session.     Recommended that patient call 911 or go to the local ED should there be a change in any of these risk factors.     Appearance:   Appropriate    Eye Contact:   Good    Psychomotor Behavior: Normal    Attitude:   Cooperative    Orientation:   Person Place Time Situation   Speech    Rate / Production: Normal/ Responsive    Volume:  Normal    Mood:    Normal   Affect:    Appropriate  Blunted    Thought Content:  Clear    Thought Form:  Coherent  Logical    Insight:    Good      Medication Review:   No changes to current psychiatric medication(s)    She has seen her PCP for medications and has an appt to see a new Psychiatrist with [facility] in 2 weeks.      Medication Compliance:   Yes     Allergies   Allergen Reactions     Sulfa Drugs       Changes in Health Issues:   None reported     Chemical Use Review:   Substance Use: Chemical use reviewed, no active concerns identified      Tobacco Use: No current tobacco use.      Diagnosis:  1. Major depressive disorder, recurrent episode, moderate (H)      Collateral Reports Completed:   Routed note to PCP    PLAN: (Patient Tasks / Therapist Tasks / Other):Reviewed 4/14/2022  Patient will continue to  identify at least one pleasurable/meaningful activity to do by herself or someone of her choice.  Patient will reflect on today's visit to make changes to help ease her daily burdens while involving children in healthy household ctivities.   Patient will continue to review and practice the CBT skills sent via my chart  Patient will keep the plan to walk every other day for at least 30 minutes each time.   Patient will pick a handson activity including journaling using GLAD  Patient will practice the tips she learned from watcingh the [employer]ube on Dermatillomania being sent via my chart.   Will develop the rules of the house with younger kids using the model being sent via [employer].   Patient's next visit is in 2 weeks.    Hany Cardoso [facility]   Provider Oversight:  Dr. Mckinley Rea  ___________________________________________________________________    Individual Treatment Plan    Patient's Name: Leticia Lawson  YOB: 1985    Date of Creation: 2/25/2022    Date Treatment Plan Last Reviewed/Revised:  2/25/2022    DSM5 Diagnoses: 296.32 (F33.1) Major Depressive Disorder, Recurrent Episode, Moderate _ and With mixed features, 300.02 (F41.1) Generalized Anxiety Disorder or 300.3 (F42) Obsessive Compulsive Disorder    Psychosocial / Contextual Factors:  MDD; TORRI, OCD, adjusting to new life here in [place]. Increased unexplained anger.    PROMIS (reviewed every 90 days): 17;  Completed on 2/04/2022     Referral / Collaboration:  Referral to another professional/service is not indicated at this time..    Anticipated number of session for this episode of care: 12  Anticipation frequency of session: Biweekly  Anticipated Duration of each session: 38-52 minutes  Treatment plan will be reviewed in 90 days or when goals have been changed.      MeasurableTreatment Goal(s) related to diagnosis / functional impairment(s)  Goal 1:Patient will develop an understanding of how avoidance of the grief process may affect  functioning in all areas of functioning.   I will know I've met my goal when I have developed alternative diversions and other coping mechanisms that are related to loss issues by the next review       Objective #A (Patient Action)                          Status: New - Date: 2/25/2022  Patient will talk to at least two others about losses and coping.  Intervention(s)              Therapist will provide some education on how to safely share feelings of loss with others.     Goal 2: Patient will develop healthy cognitive patterns and beliefs about self and the world that lead to alleviation and help prevent the relapse of depression symptoms.     I will know I've met my goal when my level of my depression is reduced from 4/4 to 3/4 or better by the next review.       Objective #A (Patient Action)                          Patient will identify at least 4 stressors which contribute to feelings of depression.  Status: New - Date: 2/25/2022     Intervention(s)  Therapist will teach distraction skills. including seff soothing with the 5 senses.     Objective #B  Patient will Identify negative self-talk and behaviors: challenge core beliefs, myths, and actions.  Status: New - Date: 2/25/2022  Intervention(s)  Therapist will Introduce CBT skills.     Objective #C  Patient will Increase interest, engagement, and pleasure in doing things.  Status: New - Date: 2/25/2022   Intervention(s)  Therapist will Encourage patient to share identify and share thoughts and feelings to increase self confidence .     Goal 3: Client will will stabilize anxiety level while increasing ability to function on a daily basis.     I will know I've met my goal when my anxiety is reduced from 4/4 to 3/4 or better by the next review       Objective #A (Client Action)                Client will Increase interest, engagement, and pleasure in doing things.  Status: New - Date: 2/25/2022     Intervention(s)  Therapist will provide educational materials on  "distraction activities.     Objective #B  Client will identify at least 4 fears / thoughts that contribute to feeling anxious.  Status: New - Date: 2/25/2022     Intervention(s)  Therapist will teach how to challenge negative thoughts using CBT skills including the 3 Cs.     Objective #C  Client will use thought-stopping strategy daily to reduce intrusive thoughts.  Status: New - Date: 2/25/2022     Intervention(s)              Therapist will Teach how to use CBT: 3 Cs to challenge the NTs as they  present.      Patient will reduce the frequency, intensity, and duration of obsession.     I will know I've met my goal when I am able to control my unwanted thoughts, images, or impulses that distress and/ interfere with my daily routine, job performance, or social relationship, at least at 60 % of the time by the next review\"     Objective #A (Patient Action)                          Status: New - Date: 2/25/2022     Patient will limit amount of time spent on repetitive or obsessive thoughts to  3 min or less minutes.     Intervention(s)  Therapist will teach emotional recognition/identification. related to behavior change.  Objective #B  Patient will use cognitive strategies identified in therapy to challenge anxious thoughts.                      Status: New - Date: 2/25/2022     Intervention(s)  Therapist will teach CBT skills to challenge ANTs related to obsession and compulsive behaviors .  Objective #C  Patient will use thought-stopping strategy daily to reduce intrusive thoughts.  Status: New - Date: 2/25/2022  Intervention(s)  Therapist will assist the patient Identify any distortions and emotions that affect patient's productivity.     Goal 4: Client will develop an awareness of angry thoughts, feelings, and actions, clarifying origins of, and learning alternatives to aggressive anger.    I will know I've met my goal when the intensity of anger is reduced from 4/4 to 3/4 or better by the next " "review\"    Objective #A (Client Action)    Status: New - Date: 2/25/2022   Client will use progressive relaxation exercise once in the morning and once in the evening to help relieve tension  practice deep diaphragm breathing once daily for at least 5 minutes to reduce anger / irritability and regain a calmer, more clear state of mind  use anger journal once daily to express any thought distortions or irrational beliefs that contribute to anger or irritation.  Intervention(s)  Therapist will assign homework  teach STAR TIPS, practice CBT skills to reduce anger.    Objective #B  Client will use progressive relaxation exercise once in the morning and once in the evening to help relieve tension.    Status: New - Date: 2/25/2022   Intervention(s)  Therapist will role-play conflict management.    Objective #C  Client will use anger journal once daily to express identified  thought distortions or irrational beliefs that contribute to anger or irritation.  Status: New - Date: 2/25/2022   Intervention(s)  Therapist will provide space for the patient to express her frustration and be able to receiive input to help alleviate her anger.    Patient has reviewed and agreed to the above plan.    ZAHRA Cruz  February 25, 2022    Answers for HPI/ROS submitted by the patient on 3/17/2022  If you checked off any problems, how difficult have these problems made it for you to do your work, take care of things at home, or get along with other people?: Very difficult  PHQ9 TOTAL SCORE: 11  "

## 2022-04-18 ENCOUNTER — OFFICE VISIT (OUTPATIENT)
Dept: NEUROLOGY | Facility: CLINIC | Age: 37
End: 2022-04-18
Attending: NURSE PRACTITIONER
Payer: COMMERCIAL

## 2022-04-18 ENCOUNTER — LAB (OUTPATIENT)
Dept: LAB | Facility: HOSPITAL | Age: 37
End: 2022-04-18
Payer: COMMERCIAL

## 2022-04-18 VITALS
BODY MASS INDEX: 25.28 KG/M2 | SYSTOLIC BLOOD PRESSURE: 110 MMHG | DIASTOLIC BLOOD PRESSURE: 69 MMHG | HEIGHT: 61 IN | WEIGHT: 133.9 LBS | HEART RATE: 76 BPM

## 2022-04-18 DIAGNOSIS — M62.81 GENERALIZED MUSCLE WEAKNESS: ICD-10-CM

## 2022-04-18 DIAGNOSIS — R42 DIZZINESS: ICD-10-CM

## 2022-04-18 LAB
CK SERPL-CCNC: 54 U/L (ref 30–190)
FOLATE SERPL-MCNC: 3.6 NG/ML
TSH SERPL DL<=0.005 MIU/L-ACNC: 1.1 UIU/ML (ref 0.3–5)
VIT B12 SERPL-MCNC: 497 PG/ML (ref 213–816)

## 2022-04-18 PROCEDURE — 86780 TREPONEMA PALLIDUM: CPT

## 2022-04-18 PROCEDURE — 84425 ASSAY OF VITAMIN B-1: CPT

## 2022-04-18 PROCEDURE — 36415 COLL VENOUS BLD VENIPUNCTURE: CPT

## 2022-04-18 PROCEDURE — 84207 ASSAY OF VITAMIN B-6: CPT

## 2022-04-18 PROCEDURE — 84443 ASSAY THYROID STIM HORMONE: CPT

## 2022-04-18 PROCEDURE — 82607 VITAMIN B-12: CPT

## 2022-04-18 PROCEDURE — 82746 ASSAY OF FOLIC ACID SERUM: CPT

## 2022-04-18 PROCEDURE — 83921 ORGANIC ACID SINGLE QUANT: CPT

## 2022-04-18 PROCEDURE — 82550 ASSAY OF CK (CPK): CPT

## 2022-04-18 PROCEDURE — 99205 OFFICE O/P NEW HI 60 MIN: CPT | Performed by: PSYCHIATRY & NEUROLOGY

## 2022-04-18 NOTE — NURSING NOTE
"Chief Complaint   Patient presents with     Extremity Weakness     BUE/BLE weakness, \"Shaking\" when stands, lightheaded, trouble focusing, fatigue x8 months      Azalea Hall CMA on 4/18/2022 at 1:57 PM    "

## 2022-04-18 NOTE — PROGRESS NOTES
NEUROLOGY CONSULTATION NOTE       [facility] [place]  [address] [place] [address]  Tel: [phone]  Fax: [phone]  [email or web address]     Leticia Lawson,  1985, MRN [id number]  PCP: Liya Chaudhry  Date: 2022     ASSESSMENT & PLAN     Visit Diagnosis  1. Dizziness  2. Generalized muscle weakness     Generalized weakness  36-year-old female with history of severe recurrent depression, TORRI, OCD who was referred for evaluation of progressive generalized weakness, shaking in the legs and trouble going up or down the stairs.  So far MRI of the brain and lumbar spine are normal and extensive lab work was normal except for finding to suggest positive mono.  Her primary concern is demyelinating disease but her history does not suggest that diagnosis and I suspect her symptoms are due to her underlying mental health issues.  However, I have recommended:    1.  MRI cervical and thoracic spine  2.  Lab work to include vitamin B-1, B6, B12, RPR, MMA, folate and CK  3.  EMG right upper and lower extremity  4.  Follow-up today EMG scheduled    Thank you again for this referral, please feel free to contact me if you have any questions.    Alex Zhang MD  [facility]  (Former[facility], [facility]     REASON FOR CONSULTATION Extremity Weakness        HISTORY OF PRESENT ILLNESS     We have been requested by Liya Chaudhry to evaluate Leticia Lawson who is a 36 year old  female for generalized weakness and tremors    Patient is a 36-year-old female with history of severe recurrent depression, TORRI, OCD who was referred for evaluation of progressive generalized weakness, shaking in the legs with trouble going up or down the stairs since last fall.  Patient started having multiple symptoms including generalized shaking and weakness when standing, decreased handgrip to the point that she has trouble opening jars  feeling of lightheadedness, focusing.  She had MRI of the brain that was unremarkable and a recently due to worsening symptoms that MRI of lumbar spine that was also within normal limits.  She had lab work that included normal ANCA, angiotensin-converting enzyme, SSA/SSB antibody, CRP, ANDI panel, double-stranded antibodies, complement profile, Lyme titer, Elen 1 antibody, CK, hepatitis panel, lupus anticoagulant, cardiolipin antibody, aldolase, normal MTHFR, B12, rheumatoid factor, CCP antibody.  She was positive for CMV antibody IgG and it was felt her fatigue could be due to mono.     PROBLEM LIST   Patient Active Problem List   Diagnosis Code     Obsessive-compulsive disorder, unspecified type F42.9     Severe episode of recurrent major depressive disorder, without psychotic features (H) F33.2     TORRI (generalized anxiety disorder) F41.1     Thyroid nodule E04.1     ASCUS of cervix with negative high risk HPV R87.610     Positive FIONA (antinuclear antibody) R76.8         PAST MEDICAL & SURGICAL HISTORY     Past Medical History:   Patient  has a past medical history of Abnormal Pap smear of cervix (08/18/2021), Depressive disorder (2001), and Thyroid nodule.    Surgical History:  She  has a past surgical history that includes tubal ligation (04/19/2019); Egd; d & c (2010); Abdomen surgery (2019); and GI surgery (2011).     SOCIAL HISTORY     Reviewed, and she  reports that she quit smoking about 7 years ago. Her smoking use included vaping device. She started smoking about 21 years ago. She smoked 0.00 packs per day for 0.00 years. She has never used smokeless tobacco. She reports current alcohol use. She reports that she does not use drugs.     FAMILY HISTORY     Reviewed, and family history includes Alzheimer Disease in her maternal grandmother; Anxiety Disorder in an other family member; Arthritis in her mother; Asthma in an other family member; Bipolar Disorder in her sister; Bone Cancer in her paternal  "grandmother; Breast Cancer in her paternal grandmother; Depression in her mother and another family member; Emphysema in her paternal grandfather; Hyperlipidemia in her maternal grandmother and mother; Lung Cancer in her maternal grandfather; Mental Illness in her sister; Ovarian Cancer in her mother; Thyroid Disease in her mother.     ALLERGIES     Allergies   Allergen Reactions     Sulfa Drugs          REVIEW OF SYSTEMS     A 12 point review of system was performed and was negative except as outlined in the history of present illness.     HOME MEDICATIONS     Current Outpatient Rx   Medication Sig Dispense Refill     cyclobenzaprine (FLEXERIL) 5 MG tablet Take 1/2-1 tab prior to bedtime, prn.  If still symptomatic or if still not sleeping well and well-tolerated can increase by half tablet (half milligram increments) up to 2 tablets prior to bedtime, as needed. 45 tablet 2     meloxicam (MOBIC) 7.5 MG tablet Take 1/4 tab twice per day 60 tablet 2     metoclopramide (REGLAN) 10 MG tablet        omeprazole 20 MG tablet Take 20 mg by mouth daily       sertraline (ZOLOFT) 50 MG tablet Take 1 tablet (50 mg) by mouth daily 30 tablet 1     vitamin D3 (CHOLECALCIFEROL) 250 mcg (64075 units) capsule Take 1 capsule by mouth daily           PHYSICAL EXAM     Vital signs  /69 (BP Location: Left arm, Patient Position: Sitting)   Pulse 76   Ht 1.549 m (5' 1\")   Wt 60.7 kg (133 lb 14.4 oz)   LMP 03/12/2022   BMI 25.30 kg/m      Weight:   133 lbs 14.4 oz    Patient is alert and oriented x3 vital signs are reviewed and are documented in electronic medical record.  Neck supple no bruits noted chest clear neurologically speech mentation and affect was normal.  Cranial nerves II through XII are intact motor strength 5/5 reflexes 2+ toes downgoing.  Sensation intact.  Minimal dysmetria on finger-nose testing gait testing normal she is able to walk on her toes and heels without any difficulty.  Romberg negative. "     PERTINENT DIAGNOSTIC STUDIES     Following studies were reviewed:     MRI BRAIN 11/11/2021  1.  No acute intracranial process.  2.  A couple foci of T2/FLAIR hyperintensity within the cerebral white matter may reflect minor sequela of previous microvascular ischemic or inflammatory insults and have also been described in the setting of chronic migraine headaches.    MRI LUMBAR SPINE 3/29/2022  1. Mild disc degeneration at L5-S1 and facet arthropathy at L4-5. No canal or foraminal stenosis.   2. The remaining levels appear normal.     PERTINENT LABS  Following labs were reviewed:  No visits with results within 3 Month(s) from this visit.   Latest known visit with results is:   Lab on 12/30/2021   Component Date Value     Myoglobin 12/30/2021 29      Aldolase 12/30/2021 3.1      Cardiolipin Sherry IgG Inst* 12/30/2021 <2.0      Cardiolipin Antibody IgG 12/30/2021 Negative      Cardiolipin Sherry IgM Inst* 12/30/2021 2.0      Cardiolipin Antibody IgM 12/30/2021 Negative      INR 12/30/2021 0.98      Thrombin Time 12/30/2021 16.7      PTT Ratio 12/30/2021 0.95      DRVVT Screen Ratio 12/30/2021 0.94      Lupus Result 12/30/2021 Negative      Lupus Interpretation 12/30/2021                      Value:The INR is normal.  APTT ratio is normal.    DRVVT Screen ratio is normal.  Thrombin time is normal.  NEGATIVE TEST; A LUPUS ANTICOAGULANT WAS NOT DETECTED IN THIS SPECIMEN WITHIN THE LIMITS OF THE TESTING REPERTOIRE.  If the clinical picture is strongly suggestive of an antiphospholipid syndrome, recommend anticardiolipin and beta-2-glycoprotein (IgG and IgM) antibody tests.    Ana Dave MD, PhD  [facility]           Color Urine 12/30/2021 Yellow      Appearance Urine 12/30/2021 Clear      Glucose Urine 12/30/2021 Negative      Bilirubin Urine 12/30/2021 Negative      Ketones Urine 12/30/2021 Negative      Specific Gravity Urine 12/30/2021 1.010      Blood Urine 12/30/2021 Negative      pH Urine 12/30/2021 5.0       Protein Albumin Urine 12/30/2021 Negative      Urobilinogen Urine 12/30/2021 0.2      Nitrite Urine 12/30/2021 Negative      Leukocyte Esterase Urine 12/30/2021 Negative      Creatinine Urine mg/dL 12/30/2021 43      Albumin Urine mg/L 12/30/2021 <5      Albumin Urine mg/g Cr 12/30/2021       Hepatitis C Antibody 12/30/2021 Nonreactive      CK 12/30/2021 113      Elen 1 Sherry IgG Instrument * 12/30/2021 <0.5      Elen 1 Antibody IgG 12/30/2021 Negative      Lyme Disease Antibodies * 12/30/2021 0.52      C3 Complement 12/30/2021 118      C4 Complement 12/30/2021 33      DNA (ds) Antibody 12/30/2021 0.9      RNP Sherry IgG Instrument V* 12/30/2021 1.2      RNP Antibody IgG 12/30/2021 Negative      Grimes ANDI Sherry IgG Instru* 12/30/2021 <1.6      Grimes ANDI Antibody IgG 12/30/2021 Negative      SSA Sherry IgG Instrument V* 12/30/2021 <0.5      SSA (Ro) Antibody IgG 12/30/2021 Negative      SSB Sherry IgG Instrument V* 12/30/2021 <0.6      SSB (La) Antibody IgG 12/30/2021 Negative      Cardiolipin Sherry IgA Inst* 12/30/2021 2.3      Cardiolipin Antibody IgA 12/30/2021 Negative      [id number] METHOD 12/30/2021 SSOP      B locus 12/30/2021 B27 Pos      CRP Inflammation 12/30/2021 <2.9      Erythrocyte Sedimentatio* 12/30/2021 6      Uric Acid 12/30/2021 3.4      SSA Sherry IgG Instrument V* 12/30/2021 <0.5      SSA (Ro) Antibody IgG 12/30/2021 Negative      SSB Sherry IgG Instrument V* 12/30/2021 <0.6      SSB (La) Antibody IgG 12/30/2021 Negative      Angiotensin Converting E* 12/30/2021 38      Neutrophil Cytoplasmic A* 12/30/2021 <1:10      Neutrophil Cytoplasmic A* 12/30/2021 The ANCA IFA is <1:10.  No further testing will be performed.      RBC Urine 12/30/2021 0-2      WBC Urine 12/30/2021 0-5      Squamous Epithelials Uri* 12/30/2021 Few (A)        Total time spent for face to face visit, reviewing labs/imaging studies, counseling and coordination of care was: 1 Hour spent on the date of the encounter doing chart review, review of outside  records, review of test results, interpretation of tests, patient visit and documentation       This note was dictated using voice recognition software.  Any grammatical or context distortions are unintentional and inherent to the software.    Orders Placed This Encounter   Procedures     MR Cervical Spine w/o & w Contrast     MR Thoracic Spine w/o & w Contrast     CK total     Folate     Methylmalonic Acid     Vitamin B1 whole blood     Vitamin B12     Vitamin B6     TSH with free T4 reflex     Treponema Abs w Reflex to RPR and Titer     EMG      New Prescriptions    No medications on file      Modified Medications    No medications on file

## 2022-04-18 NOTE — NURSING NOTE
Patient: Leticia Lawson  YARYB: 1985  Sex: Female  Phone: [phone]    [facility] MRN: [id number]  Exam Date: 03/29/2022     EXAM: MRI OF THE LUMBAR SPINE    CLINICAL INFORMATION: 36-year-old female with severe low back pain and stiffness. Intermittent hip pain. No previous spine surgery.    TECHNICAL INFORMATION: T1, T2 fast spin echo and STIR sagittal thin sections through the lumbar spine with T1 and T2 fast spin echo axial sections at selected levels. T1-weighted coronal images were also acquired.    INTERPRETATION:    Segmentation and Alignment: Normal segmentation of the lumbar vertebral elements is demonstrated. Lordotic alignment of lumbar spine is maintained. There is mild hyperlordosis at the lumbosacral junction.    Sacrum and Sacroiliac joints: The upper bony sacrum and sacroiliac joints appear normal.    Conus/distal cord: Normal signal intensity within the conus medullaris and visualized lower spinal cord is demonstrated. No intradural mass or arachnoidal adhesions are evident.    Osseous and paraspinous structures: There are no paraspinous soft tissue abnormalities.    At L5-S1 there is mild disc degeneration with an annular bulge. No canal or foraminal stenosis.    At L4-5 there is mild facet arthrosis. Disc contour is normal and there is normal caliber of the canal and foramina.    The L3-4 through T12-L1 levels appear normal.    CONCLUSION:    1. Mild disc degeneration at L5-S1 and facet arthropathy at L4-5. No canal or foraminal stenosis.    2. The remaining levels appear normal.        Electronically signed on 3/30/2022 2:53:00 PM by Hector Rushing M.D.

## 2022-04-18 NOTE — LETTER
2022         RE: Leticia Lawson  [address]  [place] [address]        Dear Colleague,    Thank you for referring your patient, Leticia Lawson, to the [facility] [place]. Please see a copy of my visit note below.    NEUROLOGY CONSULTATION NOTE       [facility] [place]  [address] [place] [address]  Tel: [phone]  Fax: [phone]  [email or web address]     Leticia Lawson,  1985, MRN [id number]  PCP: Liya Chaudhry  Date: 2022     ASSESSMENT & PLAN     Visit Diagnosis  1. Dizziness  2. Generalized muscle weakness     Generalized weakness  36-year-old female with history of severe recurrent depression, TORRI, OCD who was referred for evaluation of progressive generalized weakness, shaking in the legs and trouble going up or down the stairs.  So far MRI of the brain and lumbar spine are normal and extensive lab work was normal except for finding to suggest positive mono.  Her primary concern is demyelinating disease but her history does not suggest that diagnosis and I suspect her symptoms are due to her underlying mental health issues.  However, I have recommended:    1.  MRI cervical and thoracic spine  2.  Lab work to include vitamin B-1, B6, B12, RPR, MMA, folate and CK  3.  EMG right upper and lower extremity  4.  Follow-up today EMG scheduled    Thank you again for this referral, please feel free to contact me if you have any questions.    Alex Zhang MD  [facility]  (Former[facility], [facility]     REASON FOR CONSULTATION Extremity Weakness        HISTORY OF PRESENT ILLNESS     We have been requested by Liya Chaudhry to evaluate Leticia Lawson who is a 36 year old  female for generalized weakness and tremors    Patient is a 36-year-old female with history of severe recurrent depression, TORRI, OCD who was referred for evaluation of progressive  generalized weakness, shaking in the legs with trouble going up or down the stairs since last fall.  Patient started having multiple symptoms including generalized shaking and weakness when standing, decreased handgrip to the point that she has trouble opening jars feeling of lightheadedness, focusing.  She had MRI of the brain that was unremarkable and a recently due to worsening symptoms that MRI of lumbar spine that was also within normal limits.  She had lab work that included normal ANCA, angiotensin-converting enzyme, SSA/SSB antibody, CRP, ANDI panel, double-stranded antibodies, complement profile, Lyme titer, Elen 1 antibody, CK, hepatitis panel, lupus anticoagulant, cardiolipin antibody, aldolase, normal MTHFR, B12, rheumatoid factor, CCP antibody.  She was positive for CMV antibody IgG and it was felt her fatigue could be due to mono.     PROBLEM LIST   Patient Active Problem List   Diagnosis Code     Obsessive-compulsive disorder, unspecified type F42.9     Severe episode of recurrent major depressive disorder, without psychotic features (H) F33.2     TORRI (generalized anxiety disorder) F41.1     Thyroid nodule E04.1     ASCUS of cervix with negative high risk HPV R87.610     Positive FIONA (antinuclear antibody) R76.8         PAST MEDICAL & SURGICAL HISTORY     Past Medical History:   Patient  has a past medical history of Abnormal Pap smear of cervix (08/18/2021), Depressive disorder (2001), and Thyroid nodule.    Surgical History:  She  has a past surgical history that includes tubal ligation (04/19/2019); Egd; d & c (2010); Abdomen surgery (2019); and GI surgery (2011).     SOCIAL HISTORY     Reviewed, and she  reports that she quit smoking about 7 years ago. Her smoking use included vaping device. She started smoking about 21 years ago. She smoked 0.00 packs per day for 0.00 years. She has never used smokeless tobacco. She reports current alcohol use. She reports that she does not use drugs.     FAMILY  "HISTORY     Reviewed, and family history includes Alzheimer Disease in her maternal grandmother; Anxiety Disorder in an other family member; Arthritis in her mother; Asthma in an other family member; Bipolar Disorder in her sister; Bone Cancer in her paternal grandmother; Breast Cancer in her paternal grandmother; Depression in her mother and another family member; Emphysema in her paternal grandfather; Hyperlipidemia in her maternal grandmother and mother; Lung Cancer in her maternal grandfather; Mental Illness in her sister; Ovarian Cancer in her mother; Thyroid Disease in her mother.     ALLERGIES     Allergies   Allergen Reactions     Sulfa Drugs          REVIEW OF SYSTEMS     A 12 point review of system was performed and was negative except as outlined in the history of present illness.     HOME MEDICATIONS     Current Outpatient Rx   Medication Sig Dispense Refill     cyclobenzaprine (FLEXERIL) 5 MG tablet Take 1/2-1 tab prior to bedtime, prn.  If still symptomatic or if still not sleeping well and well-tolerated can increase by half tablet (half milligram increments) up to 2 tablets prior to bedtime, as needed. 45 tablet 2     meloxicam (MOBIC) 7.5 MG tablet Take 1/4 tab twice per day 60 tablet 2     metoclopramide (REGLAN) 10 MG tablet        omeprazole 20 MG tablet Take 20 mg by mouth daily       sertraline (ZOLOFT) 50 MG tablet Take 1 tablet (50 mg) by mouth daily 30 tablet 1     vitamin D3 (CHOLECALCIFEROL) 250 mcg (60159 units) capsule Take 1 capsule by mouth daily           PHYSICAL EXAM     Vital signs  /69 (BP Location: Left arm, Patient Position: Sitting)   Pulse 76   Ht 1.549 m (5' 1\")   Wt 60.7 kg (133 lb 14.4 oz)   LMP 03/12/2022   BMI 25.30 kg/m      Weight:   133 lbs 14.4 oz    Patient is alert and oriented x3 vital signs are reviewed and are documented in electronic medical record.  Neck supple no bruits noted chest clear neurologically speech mentation and affect was normal.  " Cranial nerves II through XII are intact motor strength 5/5 reflexes 2+ toes downgoing.  Sensation intact.  Minimal dysmetria on finger-nose testing gait testing normal she is able to walk on her toes and heels without any difficulty.  Romberg negative.     PERTINENT DIAGNOSTIC STUDIES     Following studies were reviewed:     MRI BRAIN 11/11/2021  1.  No acute intracranial process.  2.  A couple foci of T2/FLAIR hyperintensity within the cerebral white matter may reflect minor sequela of previous microvascular ischemic or inflammatory insults and have also been described in the setting of chronic migraine headaches.    MRI LUMBAR SPINE 3/29/2022  1. Mild disc degeneration at L5-S1 and facet arthropathy at L4-5. No canal or foraminal stenosis.   2. The remaining levels appear normal.     PERTINENT LABS  Following labs were reviewed:  No visits with results within 3 Month(s) from this visit.   Latest known visit with results is:   Lab on 12/30/2021   Component Date Value     Myoglobin 12/30/2021 29      Aldolase 12/30/2021 3.1      Cardiolipin Sherry IgG Inst* 12/30/2021 <2.0      Cardiolipin Antibody IgG 12/30/2021 Negative      Cardiolipin Sherry IgM Inst* 12/30/2021 2.0      Cardiolipin Antibody IgM 12/30/2021 Negative      INR 12/30/2021 0.98      Thrombin Time 12/30/2021 16.7      PTT Ratio 12/30/2021 0.95      DRVVT Screen Ratio 12/30/2021 0.94      Lupus Result 12/30/2021 Negative      Lupus Interpretation 12/30/2021                      Value:The INR is normal.  APTT ratio is normal.    DRVVT Screen ratio is normal.  Thrombin time is normal.  NEGATIVE TEST; A LUPUS ANTICOAGULANT WAS NOT DETECTED IN THIS SPECIMEN WITHIN THE LIMITS OF THE TESTING REPERTOIRE.  If the clinical picture is strongly suggestive of an antiphospholipid syndrome, recommend anticardiolipin and beta-2-glycoprotein (IgG and IgM) antibody tests.    Ana Dave MD, PhD  [facility]           Color Urine 12/30/2021 Yellow      Appearance  Urine 12/30/2021 Clear      Glucose Urine 12/30/2021 Negative      Bilirubin Urine 12/30/2021 Negative      Ketones Urine 12/30/2021 Negative      Specific Gravity Urine 12/30/2021 1.010      Blood Urine 12/30/2021 Negative      pH Urine 12/30/2021 5.0      Protein Albumin Urine 12/30/2021 Negative      Urobilinogen Urine 12/30/2021 0.2      Nitrite Urine 12/30/2021 Negative      Leukocyte Esterase Urine 12/30/2021 Negative      Creatinine Urine mg/dL 12/30/2021 43      Albumin Urine mg/L 12/30/2021 <5      Albumin Urine mg/g Cr 12/30/2021       Hepatitis C Antibody 12/30/2021 Nonreactive      CK 12/30/2021 113      Elen 1 Sherry IgG Instrument * 12/30/2021 <0.5      Elen 1 Antibody IgG 12/30/2021 Negative      Lyme Disease Antibodies * 12/30/2021 0.52      C3 Complement 12/30/2021 118      C4 Complement 12/30/2021 33      DNA (ds) Antibody 12/30/2021 0.9      RNP Sherry IgG Instrument V* 12/30/2021 1.2      RNP Antibody IgG 12/30/2021 Negative      Grimes ANDI Sherry IgG Instru* 12/30/2021 <1.6      Grimes ANDI Antibody IgG 12/30/2021 Negative      SSA Sherry IgG Instrument V* 12/30/2021 <0.5      SSA (Ro) Antibody IgG 12/30/2021 Negative      SSB Sherry IgG Instrument V* 12/30/2021 <0.6      SSB (La) Antibody IgG 12/30/2021 Negative      Cardiolipin Sherry IgA Inst* 12/30/2021 2.3      Cardiolipin Antibody IgA 12/30/2021 Negative      [id number] METHOD 12/30/2021 SSOP      B locus 12/30/2021 B27 Pos      CRP Inflammation 12/30/2021 <2.9      Erythrocyte Sedimentatio* 12/30/2021 6      Uric Acid 12/30/2021 3.4      SSA Sherry IgG Instrument V* 12/30/2021 <0.5      SSA (Ro) Antibody IgG 12/30/2021 Negative      SSB Sherry IgG Instrument V* 12/30/2021 <0.6      SSB (La) Antibody IgG 12/30/2021 Negative      Angiotensin Converting E* 12/30/2021 38      Neutrophil Cytoplasmic A* 12/30/2021 <1:10      Neutrophil Cytoplasmic A* 12/30/2021 The ANCA IFA is <1:10.  No further testing will be performed.      RBC Urine 12/30/2021 0-2      WBC Urine 12/30/2021  0-5      Squamous Epithelials Uri* 12/30/2021 Few (A)        Total time spent for face to face visit, reviewing labs/imaging studies, counseling and coordination of care was: 1 Hour spent on the date of the encounter doing chart review, review of outside records, review of test results, interpretation of tests, patient visit and documentation       This note was dictated using voice recognition software.  Any grammatical or context distortions are unintentional and inherent to the software.    Orders Placed This Encounter   Procedures     MR Cervical Spine w/o & w Contrast     MR Thoracic Spine w/o & w Contrast     CK total     Folate     Methylmalonic Acid     Vitamin B1 whole blood     Vitamin B12     Vitamin B6     TSH with free T4 reflex     Treponema Abs w Reflex to RPR and Titer     EMG      New Prescriptions    No medications on file      Modified Medications    No medications on file              Again, thank you for allowing me to participate in the care of your patient.        Sincerely,        Alex Zhang MD

## 2022-04-19 LAB — T PALLIDUM AB SER QL: NONREACTIVE

## 2022-04-20 ENCOUNTER — HOSPITAL ENCOUNTER (OUTPATIENT)
Dept: CARDIOLOGY | Facility: CLINIC | Age: 37
Discharge: HOME | End: 2022-04-20
Attending: NURSE PRACTITIONER | Admitting: NURSE PRACTITIONER
Payer: COMMERCIAL

## 2022-04-20 DIAGNOSIS — R00.2 PALPITATIONS: ICD-10-CM

## 2022-04-20 LAB — LVEF ECHO: NORMAL

## 2022-04-20 PROCEDURE — 93306 TTE W/DOPPLER COMPLETE: CPT

## 2022-04-20 PROCEDURE — 93306 TTE W/DOPPLER COMPLETE: CPT | Mod: 26 | Performed by: INTERNAL MEDICINE

## 2022-04-21 LAB — METHYLMALONATE SERPL-SCNC: 0.25 UMOL/L (ref 0–0.4)

## 2022-04-22 LAB — PYRIDOXAL PHOS SERPL-SCNC: 39.3 NMOL/L

## 2022-04-25 LAB — VIT B1 PYROPHOSHATE BLD-SCNC: 96 NMOL/L

## 2022-04-28 ENCOUNTER — VIRTUAL VISIT (OUTPATIENT)
Dept: BEHAVIORAL HEALTH | Facility: HOSPITAL | Age: 37
End: 2022-04-28
Attending: PSYCHIATRY & NEUROLOGY
Payer: COMMERCIAL

## 2022-04-28 DIAGNOSIS — F33.1 MAJOR DEPRESSIVE DISORDER, RECURRENT EPISODE, MODERATE (H): Primary | ICD-10-CM

## 2022-04-28 PROCEDURE — 90834 PSYTX W PT 45 MINUTES: CPT | Mod: GT | Performed by: SOCIAL WORKER

## 2022-04-28 ASSESSMENT — PATIENT HEALTH QUESTIONNAIRE - PHQ9
SUM OF ALL RESPONSES TO PHQ QUESTIONS 1-9: 9
10. IF YOU CHECKED OFF ANY PROBLEMS, HOW DIFFICULT HAVE THESE PROBLEMS MADE IT FOR YOU TO DO YOUR WORK, TAKE CARE OF THINGS AT HOME, OR GET ALONG WITH OTHER PEOPLE: VERY DIFFICULT
SUM OF ALL RESPONSES TO PHQ QUESTIONS 1-9: 9

## 2022-04-28 NOTE — PROGRESS NOTES
Progress Note    Patient Name: Leticia Lawson  Date:4/28/2022         Service Type: Individual      Session Start Time: 14:02   Session End Time:14:54     Session Length:52    Session #:  5    Attendees: Client    Service Modality:  Video Visit:      Provider verified identity through the following two step process.  Patient provided:  Patient is known previously to provider    Telemedicine Visit: The patient's condition can be safely assessed and treated via synchronous audio and visual telemedicine encounter.      Reason for Telemedicine Visit: Services only offered telehealth    Originating Site (Patient Location): Patient's home    Distant Site (Provider Location): Provider Remote Setting    Consent:  The patient/guardian has verbally consented to: the potential risks and benefits of telemedicine (video visit) versus in person care; bill my insurance or make self-payment for services provided; and responsibility for payment of non-covered services.     Patient would like the video invitation sent by:  My Chart    Mode of Communication:  Video Conference via [facility]well    As the provider I attest to compliance with applicable laws and regulations related to telemedicine.    DATA  Interactive Complexity: No  Crisis: No      Progress Since Last Session (Related to Symptoms / Goals / Homework):   Symptoms: Continues to feel tired but pushing to meet her needs and meet family and work demands.     Homework: Achieved / completed to satisfaction: Did practice the STAR TIPS tool; did some meaningful and enjoyable activity: Yoga      Episode of Care Goals: Satisfactory progress - ACTION (Actively working towards change); Intervened by reinforcing change plan / affirming steps taken     Current / Ongoing Stressors and Concerns: Patient reports having been accomplishing things at work. Has been talking to younger kids and guiding them with chores. Has seen a psychiatrist and her medications  were reviewed.  She is yet to feel the change. Continues to work with other providers for her physical health which she feels might largely be contributing to her fatigue. It has been hard to make herself understood that much of her experiences are not related to her anxiety but her physical health. Has been thinking about going to the gym. Has accompanied boyfriend but could not exercise. Though still committed to try at least 3 times a week for at least 30 minutes. Will work with kids about making the rules of the house and reinforce them. Her next visit is in weeks.      Treatment Objective(s) Addressed in This Session:   Decrease frequency and intensity of feeling down, depressed, hopeless  Identify negative self-talk and behaviors: challenge core beliefs, myths, and actions.  Support will be provided to process some unrealistic/distorted thinking around the presented concerns so patient can challenge them and regain some balance and in her emotions.     Intervention:  CBT skills:Reviewed interventions and goals discussed in the previous sessions. Reinforced small accomplishments. Provided encouragement to continue challenging any automatic thought as they present as she meets her personal, family and work responsibilities.      DBT:  Understanding of the states of mind to find a balance and achieve  mood regulation. Understanding states of mind and how they complete each other to help with problem solving and mood regulation. Patient will continue to strive getting to the wise mind state.     Motivational Interviewing: ID concerns and develop changeable solutions     Assessments completed prior to visit: 2/18/2022    The following assessments were completed by patient for this visit:  PHQ9:   PHQ-9 SCORE 8/18/2021 2/4/2022 2/25/2022 3/17/2022 3/30/2022 3/30/2022 4/28/2022   PHQ-9 Total Score MyChart - 17 (Moderately severe depression) - 11 (Moderate depression) - 9 (Mild depression) 9 (Mild depression)   PHQ-9  Total Score 13 17 14 11 9 9 9     GAD7:   TORRI-7 SCORE 8/18/2021 2/4/2022 2/25/2022 3/30/2022 3/30/2022   Total Score - - - - 7 (mild anxiety)   Total Score 10 14 11 7 7     CAGE-AID:   CAGE-AID Total Score 8/18/2021 2/4/2022   Total Score 0 0     PROMIS 10-Global Health (all questions and answers displayed):   PROMIS 10 2/4/2022   In general, would you say your health is: 1   In general, would you say your quality of life is: 3   In general, how would you rate your physical health? 1   In general, how would you rate your mental health, including your mood and your ability to think? 2   In general, how would you rate your satisfaction with your social activities and relationships? 3   In general, please rate how well you carry out your usual social activities and roles. (This includes activities at home, at work and in your community, and responsibilities as a parent, child, spouse, employee, friend, etc.) 3   To what extent are you able to carry out your everyday physical activities such as walking, climbing stairs, carrying groceries, or moving a chair? 2   In the past 7 days, how often have you been bothered by emotional problems such as feeling anxious, depressed, or irritable? 4   In the past 7 days, how would you rate your fatigue on average? 4   In the past 7 days, how would you rate your pain on average, where 0 means no pain, and 10 means worst imaginable pain? 7   Global Mental Health Score 10   Global Physical Health Score 7   PROMIS TOTAL - SUBSCORES 17     [place] Suicide Severity Rating Scale (Short Version)  [place] Suicide Severity Rating (Short Version) 2/4/2022   Over the past 2 weeks have you felt down, depressed, or hopeless? no   Over the past 2 weeks have you had thoughts of killing yourself? no   Have you ever attempted to kill yourself? no       ASSESSMENT: Current Emotional / Mental Status (status of significant symptoms):   Risk status (Self / Other harm or suicidal ideation)   Patient  denies current fears or concerns for personal safety.   Patient denies current or recent suicidal ideation or behaviors.   Patient denies current or recent homicidal ideation or behaviors.   Patient denies current or recent self injurious behavior or ideation.   Patient denies other safety concerns.   Patient reports there has been no change in risk factors since their last session.     Patient reports there has been no change in protective factors since their last session.     Recommended that patient call 911 or go to the local ED should there be a change in any of these risk factors.     Appearance:   Appropriate    Eye Contact:   Good    Psychomotor Behavior: Normal    Attitude:   Cooperative    Orientation:   Person Place Time Situation   Speech    Rate / Production: Normal/ Responsive    Volume:  Normal    Mood:    Normal   Affect:    Appropriate    Thought Content:  Clear    Thought Form:  Coherent  Logical    Insight:    Good      Medication Review:   No changes to current psychiatric medication(s)    She has seen her PCP for medications and recently saw a new Psychiatrist with [facility] . Her medications were reviewed.      Medication Compliance:   Yes     Allergies   Allergen Reactions     Sulfa Drugs       Changes in Health Issues:   None reported     Chemical Use Review:   Substance Use: Chemical use reviewed, no active concerns identified      Tobacco Use: No current tobacco use.      Diagnosis:  1. Major depressive disorder, recurrent episode, moderate (H)      Collateral Reports Completed:   Routed note to PCP    PLAN: (Patient Tasks / Therapist Tasks / Other):Reviewed 4/28/2022  Patient will continue to identify at least one pleasurable/meaningful activity to do by herself or someone of her choice.  Patient will involve kids enveloping the rule of the house and reinforce them.   Patient will continue to review and practice the CBT skills sent via my chart  Patient will keep the plan to  walk every other day or go to the gym for at least 30 minutes each time.   Patient will continue with hands on activities including journaling using GLAD  Patient will practice the tips she learned from watcingh the [employer] on Dermatillomania being sent via my chart. .   Patient's next visit is in 2 weeks.    Hany Cardoso, LICSW   ___________________________________________________________________    Individual Treatment Plan    Patient's Name: Leticia Lawson  YOB: 1985    Date of Creation: 2/25/2022    Date Treatment Plan Last Reviewed/Revised:  2/25/2022    DSM5 Diagnoses: 296.32 (F33.1) Major Depressive Disorder, Recurrent Episode, Moderate _ and With mixed features, 300.02 (F41.1) Generalized Anxiety Disorder or 300.3 (F42) Obsessive Compulsive Disorder    Psychosocial / Contextual Factors:  MDD; TORRI, OCD, adjusting to new life here in [place]. Increased unexplained anger.    PROMIS (reviewed every 90 days): 17;  Completed on 2/04/2022     Referral / Collaboration:  Referral to another professional/service is not indicated at this time..    Anticipated number of session for this episode of care: 12  Anticipation frequency of session: Biweekly  Anticipated Duration of each session: 38-52 minutes  Treatment plan will be reviewed in 90 days or when goals have been changed.      MeasurableTreatment Goal(s) related to diagnosis / functional impairment(s)  Goal 1:Patient will develop an understanding of how avoidance of the grief process may affect functioning in all areas of functioning.   I will know I've met my goal when I have developed alternative diversions and other coping mechanisms that are related to loss issues by the next review       Objective #A (Patient Action)                          Status: New - Date: 2/25/2022  Patient will talk to at least two others about losses and coping.  Intervention(s)              Therapist will provide some education on how to safely share feelings of loss  with others.     Goal 2: Patient will develop healthy cognitive patterns and beliefs about self and the world that lead to alleviation and help prevent the relapse of depression symptoms.     I will know I've met my goal when my level of my depression is reduced from 4/4 to 3/4 or better by the next review.       Objective #A (Patient Action)                          Patient will identify at least 4 stressors which contribute to feelings of depression.  Status: New - Date: 2/25/2022     Intervention(s)  Therapist will teach distraction skills. including seff soothing with the 5 senses.     Objective #B  Patient will Identify negative self-talk and behaviors: challenge core beliefs, myths, and actions.  Status: New - Date: 2/25/2022  Intervention(s)  Therapist will Introduce CBT skills.     Objective #C  Patient will Increase interest, engagement, and pleasure in doing things.  Status: New - Date: 2/25/2022   Intervention(s)  Therapist will Encourage patient to share identify and share thoughts and feelings to increase self confidence .     Goal 3: Client will will stabilize anxiety level while increasing ability to function on a daily basis.     I will know I've met my goal when my anxiety is reduced from 4/4 to 3/4 or better by the next review       Objective #A (Client Action)                Client will Increase interest, engagement, and pleasure in doing things.  Status: New - Date: 2/25/2022     Intervention(s)  Therapist will provide educational materials on distraction activities.     Objective #B  Client will identify at least 4 fears / thoughts that contribute to feeling anxious.  Status: New - Date: 2/25/2022     Intervention(s)  Therapist will teach how to challenge negative thoughts using CBT skills including the 3 Cs.     Objective #C  Client will use thought-stopping strategy daily to reduce intrusive thoughts.  Status: New - Date: 2/25/2022     Intervention(s)              Therapist will Teach how to  "use CBT: 3 Cs to challenge the NTs as they  present.      Patient will reduce the frequency, intensity, and duration of obsession.     I will know I've met my goal when I am able to control my unwanted thoughts, images, or impulses that distress and/ interfere with my daily routine, job performance, or social relationship, at least at 60 % of the time by the next review\"     Objective #A (Patient Action)                          Status: New - Date: 2/25/2022     Patient will limit amount of time spent on repetitive or obsessive thoughts to  3 min or less minutes.     Intervention(s)  Therapist will teach emotional recognition/identification. related to behavior change.  Objective #B  Patient will use cognitive strategies identified in therapy to challenge anxious thoughts.                      Status: New - Date: 2/25/2022     Intervention(s)  Therapist will teach CBT skills to challenge ANTs related to obsession and compulsive behaviors .  Objective #C  Patient will use thought-stopping strategy daily to reduce intrusive thoughts.  Status: New - Date: 2/25/2022  Intervention(s)  Therapist will assist the patient Identify any distortions and emotions that affect patient's productivity.     Goal 4: Client will develop an awareness of angry thoughts, feelings, and actions, clarifying origins of, and learning alternatives to aggressive anger.    I will know I've met my goal when the intensity of anger is reduced from 4/4 to 3/4 or better by the next review\"    Objective #A (Client Action)    Status: New - Date: 2/25/2022   Client will use progressive relaxation exercise once in the morning and once in the evening to help relieve tension  practice deep diaphragm breathing once daily for at least 5 minutes to reduce anger / irritability and regain a calmer, more clear state of mind  use anger journal once daily to express any thought distortions or irrational beliefs that contribute to anger or " irritation.  Intervention(s)  Therapist will assign homework  teach STAR TIPS, practice CBT skills to reduce anger.    Objective #B  Client will use progressive relaxation exercise once in the morning and once in the evening to help relieve tension.    Status: New - Date: 2/25/2022   Intervention(s)  Therapist will role-play conflict management.    Objective #C  Client will use anger journal once daily to express identified  thought distortions or irrational beliefs that contribute to anger or irritation.  Status: New - Date: 2/25/2022   Intervention(s)  Therapist will provide space for the patient to express her frustration and be able to receiive input to help alleviate her anger.    Patient has reviewed and agreed to the above plan.    Hany Cardoso [facility]  February 25, 2022    Answers for HPI/ROS submitted by the patient on 3/17/2022  If you checked off any problems, how difficult have these problems made it for you to do your work, take care of things at home, or get along with other people?: Very difficult  PHQ9 TOTAL SCORE: 11  Answers for HPI/ROS submitted by the patient on 4/28/2022  If you checked off any problems, how difficult have these problems made it for you to do your work, take care of things at home, or get along with other people?: Very difficult  PHQ9 TOTAL SCORE: 9

## 2022-04-29 ENCOUNTER — ANCILLARY PROCEDURE (OUTPATIENT)
Dept: MRI IMAGING | Facility: CLINIC | Age: 37
End: 2022-04-29
Attending: PSYCHIATRY & NEUROLOGY
Payer: COMMERCIAL

## 2022-04-29 DIAGNOSIS — M62.81 GENERALIZED MUSCLE WEAKNESS: ICD-10-CM

## 2022-04-29 PROCEDURE — 72156 MRI NECK SPINE W/O & W/DYE: CPT | Mod: TC | Performed by: RADIOLOGY

## 2022-04-29 PROCEDURE — A9585 GADOBUTROL INJECTION: HCPCS | Mod: JW | Performed by: RADIOLOGY

## 2022-04-29 RX ORDER — GADOBUTROL 604.72 MG/ML
7.5 INJECTION INTRAVENOUS ONCE
Status: COMPLETED | OUTPATIENT
Start: 2022-04-29 | End: 2022-04-29

## 2022-04-29 RX ADMIN — GADOBUTROL 6 ML: 604.72 INJECTION INTRAVENOUS at 11:26

## 2022-04-29 ASSESSMENT — PATIENT HEALTH QUESTIONNAIRE - PHQ9: SUM OF ALL RESPONSES TO PHQ QUESTIONS 1-9: 9

## 2022-05-02 DIAGNOSIS — I44.1 2ND DEGREE AV BLOCK: Primary | ICD-10-CM

## 2022-05-03 ENCOUNTER — TELEPHONE (OUTPATIENT)
Dept: CARDIOLOGY | Facility: CLINIC | Age: 37
End: 2022-05-03
Payer: COMMERCIAL

## 2022-05-03 ENCOUNTER — MYC MEDICAL ADVICE (OUTPATIENT)
Dept: CARDIOLOGY | Facility: CLINIC | Age: 37
End: 2022-05-03
Payer: COMMERCIAL

## 2022-05-03 NOTE — TELEPHONE ENCOUNTER
PREVISIT INFORMATION                                                    Leticia BOWLES Lulu scheduled for future visit at [facility] specialty clinics.    Patient is scheduled to see Dr. Valente on 05/10/2022  Reason for visit: 2nd degree AV block  Referring provider Liya REDD CNP  Has patient seen previous specialist? No  Medical Records:  Available in chart.  Patient was previously seen at a [facility] or [facility] facility.    REVIEW                                                      New patient packet mailed to patient: No  Medication reconciliation complete: Yes -no longer taking Flexeril or Vitamin D3      Current Outpatient Medications   Medication Sig Dispense Refill     cyclobenzaprine (FLEXERIL) 5 MG tablet Take 1/2-1 tab prior to bedtime, prn.  If still symptomatic or if still not sleeping well and well-tolerated can increase by half tablet (half milligram increments) up to 2 tablets prior to bedtime, as needed. 45 tablet 2     meloxicam (MOBIC) 7.5 MG tablet Take 1/4 tab twice per day 60 tablet 2     metoclopramide (REGLAN) 10 MG tablet        omeprazole 20 MG tablet Take 20 mg by mouth daily       sertraline (ZOLOFT) 50 MG tablet Take 1 tablet (50 mg) by mouth daily 30 tablet 1     vitamin D3 (CHOLECALCIFEROL) 250 mcg (16325 units) capsule Take 1 capsule by mouth daily         Allergies: Sulfa drugs        PLAN/FOLLOW-UP NEEDED                                                      Previsit review complete.  Patient will see provider at future scheduled appointment.     Patient Reminders Given:  Please, make sure you bring an updated list of your medications.   If you are having a procedure, please, present 15 minutes early.  If you need to cancel or reschedule,please call [phone].    Vonda Fleming MA

## 2022-05-10 ENCOUNTER — OFFICE VISIT (OUTPATIENT)
Dept: CARDIOLOGY | Facility: CLINIC | Age: 37
End: 2022-05-10
Payer: COMMERCIAL

## 2022-05-10 VITALS
WEIGHT: 131.9 LBS | HEART RATE: 72 BPM | DIASTOLIC BLOOD PRESSURE: 89 MMHG | OXYGEN SATURATION: 99 % | SYSTOLIC BLOOD PRESSURE: 131 MMHG | BODY MASS INDEX: 24.92 KG/M2

## 2022-05-10 DIAGNOSIS — R00.2 PALPITATIONS: Primary | ICD-10-CM

## 2022-05-10 DIAGNOSIS — I44.1 MOBITZ (TYPE) I (WENCKEBACH'S) ATRIOVENTRICULAR BLOCK: ICD-10-CM

## 2022-05-10 DIAGNOSIS — R42 ORTHOSTATIC LIGHTHEADEDNESS: ICD-10-CM

## 2022-05-10 PROCEDURE — 99205 OFFICE O/P NEW HI 60 MIN: CPT | Performed by: INTERNAL MEDICINE

## 2022-05-10 NOTE — PROGRESS NOTES
Leticia Lawson's goals for this visit include: Continue to discover why she has been feeling so bad over the last year and a half. Sleep has been good. Does not wake up feeling refreshed. Exhausted all day long. Palpitations and pounding have got worse. Laying down seems to trigger some sort of tachycardia according to patient. Blood pressure has been low.     She requests these members of her care team be copied on today's visit information: PCP    PCP: Liya Chaudhry    Referring Provider:  Referred Self, MD  No address on file    LMP 03/12/2022     Do you need any medication refills at today's visit? No.    Anthony Sapp, EMT  Clinic Support  [facility]    [phone]    Employed by [employer]

## 2022-05-10 NOTE — PROGRESS NOTES
I am delighted to see Leticia Lawson as a new patient in [place] [facility] clinic for evaluation of palpitations, fatigue, lightheadedness.    History of Present Illness:  The patient is a 36 year old  Female who has noted progressive fatigue over the last year. She had a cold last year for about a week, always tested negative for COVID. A few weeks after her cold she started feeling easy fatigue, lightheaded frequently when she stands, rapid heart rates with minimal exertion. Symptoms have progressed. She has had multiple evaluations by various subspecialties, all normal so far. She had a patch monitor placed which showed an episode of AV wenckebach and she was referred to cardiology since it has been the only abnormal finding so far.    She is , has 6 kids, is an  and works mainly from home. She is easily tired, walking from living room to kitchen to do a few dishes is challenging. She has frequent pounding sensations usually when she lies down on the couch, or when she goes up stairs to lie down on her bed - the pounding happens after she lies down, not during stair climbing. She has not been able to do much physical activities for the past year. Last night she and her  took 1 mile walk over about 25 minutes and she felt good.  She does not smoke, no significant alcohol use. Not a great eater, eats one meal a day but that's normal for her. Drinks one bottle of water a day. No sodas/coffee/energy drinks.    Past Medical History:  Tubal ligation  Thyroid nodule  Depression  GERD     Medications:   Sertraline 50 every day  Reglan  Flexeril   Mobic   Omeprazole    Allergies:    Allergies   Allergen Reactions     Sulfa Drugs        Family History: no CAD or sudden death    Physical examination  Vitals: 131/89 HR 72  BMI= 25 (59.8 kg)    Constitutional: In general, the patient is a pleasant female in no acute distress.    Cardiovascular: Carotids +2/2 bilaterally without bruits.  No jugular  venous distension. Regular rate and rhythm. Normal S1, S2. No murmur, rub, click, or gallop.   Extremities: Pulses are normal bilaterally throughout. No peripheral edema.  Respiratory: Clear to asculation.  No ronchi, wheezes, rales.  No dullness to percussion.     I have personally and independently reviewed the following:  Labs:   8/18/2021: chol 231, HDL 52, ,   11/3/2021: K 4.0, cr 0.69, TSH 2.16, hgb 13.9, plt 250K    Echo:  4/20/2022: EF 55-60%, normal RV/IVC, no valve disease, STEVEN 14    EKG 3/30/2022: sinus 66 bpm, normal intervals    Patch monitor:  3/30-4/13/2022: sinus 75 (range ). I reviewed all data with her in clinic. Her symptoms when she goes up stairs to lie down in bed, or when has pounding when lying on the couch, were all sinus rhythm between 80-90 bpm. One episode of AV Wenckebach occurred at 9am, no symptoms.    Assessment :  Palpitations, orthostatic lightheadedness. Patch monitor shows sinus rhythm with symptoms. One episode of AV Wenckebach was not associated with symptoms, would not explain her chronic and progressive fatigue, and does not require treatment. Cardiac exam, EKG, echo all normal. Recommend aggressive hydration and regular daily walks, consider compression stocking for her walks which can reduce orthostatic lightheadedness.      I spent a total of 30 minutes face to face with  Leticia Lawson during today's office visit. I have spend an additional 30 minutes today on chart review and documentation.        The patient is to return as above . The patient understood the treatment plan as outlined above.  There were no barriers to learning.      Lisette Valente MD

## 2022-05-12 ENCOUNTER — VIRTUAL VISIT (OUTPATIENT)
Dept: BEHAVIORAL HEALTH | Facility: HOSPITAL | Age: 37
End: 2022-05-12
Payer: COMMERCIAL

## 2022-05-12 DIAGNOSIS — F33.1 MAJOR DEPRESSIVE DISORDER, RECURRENT EPISODE, MODERATE (H): Primary | ICD-10-CM

## 2022-05-12 PROCEDURE — 90834 PSYTX W PT 45 MINUTES: CPT | Mod: GT | Performed by: SOCIAL WORKER

## 2022-05-12 ASSESSMENT — PATIENT HEALTH QUESTIONNAIRE - PHQ9
SUM OF ALL RESPONSES TO PHQ QUESTIONS 1-9: 6
SUM OF ALL RESPONSES TO PHQ QUESTIONS 1-9: 6
10. IF YOU CHECKED OFF ANY PROBLEMS, HOW DIFFICULT HAVE THESE PROBLEMS MADE IT FOR YOU TO DO YOUR WORK, TAKE CARE OF THINGS AT HOME, OR GET ALONG WITH OTHER PEOPLE: SOMEWHAT DIFFICULT

## 2022-05-12 NOTE — PROGRESS NOTES
Progress Note    Patient Name: Leticia Lawson  Date: 5/12/2022         Service Type: Individual      Session Start Time: 14:04  Session End Time:14:56     Session Length:52    Session #:  6    Attendees: Client    Service Modality:  Video Visit:      Provider verified identity through the following two step process.  Patient provided:  Patient is known previously to provider    Telemedicine Visit: The patient's condition can be safely assessed and treated via synchronous audio and visual telemedicine encounter.      Reason for Telemedicine Visit: Services only offered telehealth    Originating Site (Patient Location): Patient's home    Distant Site (Provider Location): Provider Remote Setting    Consent:  The patient/guardian has verbally consented to: the potential risks and benefits of telemedicine (video visit) versus in person care; bill my insurance or make self-payment for services provided; and responsibility for payment of non-covered services.     Patient would like the video invitation sent by:  My Chart    Mode of Communication:  Video Conference via [facility]well    As the provider I attest to compliance with applicable laws and regulations related to telemedicine.    DATA  Interactive Complexity: No  Crisis: No      Progress Since Last Session (Related to Symptoms / Goals / Homework):   Symptoms: Continues to feel tired but pushing to meet her needs and meet family and work demands.     Homework: Achieved / completed to satisfaction: Did practice the STAR TIPS tool; did some meaningful and enjoyable activity: Yoga      Episode of Care Goals: Satisfactory progress - ACTION (Actively working towards change); Intervened by reinforcing change plan / affirming steps taken     Current / Ongoing Stressors and Concerns: Patient reports doing well following her plans. For what she able to do, she notes some positive changes in her body.She is listening to her body and take it easy doing  what the body allows her. Did go to the gym. Has been involving kids in household activities and notes some progress. Has not picked her skin and hope to stay this way.She is still working with specialists to identify some medical concerns. The cardiologist did to detect anything wrong with her heart. She is going to see a neurologist in Summer.  Her next visit is in weeks.      Treatment Objective(s) Addressed in This Session:   Decrease frequency and intensity of feeling down, depressed, hopeless  Identify negative self-talk and behaviors: challenge core beliefs, myths, and actions.  Support will be provided to process some unrealistic/distorted thinking around the presented concerns so patient can challenge them and regain some balance and in her emotions.     Intervention: Reviewed done today 5/12/2022  CBT skills:Reviewed interventions and goals discussed in the previous sessions. Reinforced small accomplishments. Provided encouragement to continue challenging any automatic thought as they present as she meets her personal, family and work responsibilities.      DBT:  Understanding of the states of mind to find a balance and achieve  mood regulation. Understanding states of mind and how they complete each other to help with problem solving and mood regulation. Patient will continue to strive getting to the wise mind state.     Motivational Interviewing: ID concerns and develop changeable solutions     Assessments completed prior to visit: 2/18/2022    The following assessments were completed by patient for this visit:  PHQ9:   PHQ-9 SCORE 2/4/2022 2/25/2022 3/17/2022 3/30/2022 3/30/2022 4/28/2022 5/12/2022   PHQ-9 Total Score MyChart 17 (Moderately severe depression) - 11 (Moderate depression) - 9 (Mild depression) 9 (Mild depression) 6 (Mild depression)   PHQ-9 Total Score 17 14 11 9 9 9 6     GAD7:   TORRI-7 SCORE 8/18/2021 2/4/2022 2/25/2022 3/30/2022 3/30/2022   Total Score - - - - 7 (mild anxiety)   Total  Score 10 14 11 7 7     CAGE-AID:   CAGE-AID Total Score 8/18/2021 2/4/2022   Total Score 0 0     PROMIS 10-Global Health (all questions and answers displayed):   PROMIS 10 2/4/2022 5/12/2022   In general, would you say your health is: - Fair   In general, would you say your quality of life is: - Good   In general, how would you rate your physical health? - Fair   In general, how would you rate your mental health, including your mood and your ability to think? - Good   In general, how would you rate your satisfaction with your social activities and relationships? - Good   In general, please rate how well you carry out your usual social activities and roles - Fair   To what extent are you able to carry out your everyday physical activities such as walking, climbing stairs, carrying groceries, or moving a chair? - Moderately   How often have you been bothered by emotional problems such as feeling anxious, depressed or irritable? - Sometimes   How would you rate your fatigue on average? - Moderate   How would you rate your pain on average?   0 = No Pain  to  10 = Worst Imaginable Pain - 4   In general, would you say your health is: 1 2   In general, would you say your quality of life is: 3 3   In general, how would you rate your physical health? 1 2   In general, how would you rate your mental health, including your mood and your ability to think? 2 3   In general, how would you rate your satisfaction with your social activities and relationships? 3 3   In general, please rate how well you carry out your usual social activities and roles. (This includes activities at home, at work and in your community, and responsibilities as a parent, child, spouse, employee, friend, etc.) 3 2   To what extent are you able to carry out your everyday physical activities such as walking, climbing stairs, carrying groceries, or moving a chair? 2 3   In the past 7 days, how often have you been bothered by emotional problems such as  feeling anxious, depressed, or irritable? 4 3   In the past 7 days, how would you rate your fatigue on average? 4 3   In the past 7 days, how would you rate your pain on average, where 0 means no pain, and 10 means worst imaginable pain? 7 4   Global Mental Health Score 10 12   Global Physical Health Score 7 11   PROMIS TOTAL - SUBSCORES 17 23     [place] Suicide Severity Rating Scale (Short Version)  [place] Suicide Severity Rating (Short Version) 2/4/2022   Over the past 2 weeks have you felt down, depressed, or hopeless? no   Over the past 2 weeks have you had thoughts of killing yourself? no   Have you ever attempted to kill yourself? no       ASSESSMENT: Current Emotional / Mental Status (status of significant symptoms):   Risk status (Self / Other harm or suicidal ideation)   Patient denies current fears or concerns for personal safety.   Patient denies current or recent suicidal ideation or behaviors.   Patient denies current or recent homicidal ideation or behaviors.   Patient denies current or recent self injurious behavior or ideation.   Patient denies other safety concerns.   Patient reports there has been no change in risk factors since their last session.     Patient reports there has been no change in protective factors since their last session.     Recommended that patient call 911 or go to the local ED should there be a change in any of these risk factors.     Appearance:   Appropriate    Eye Contact:   Good    Psychomotor Behavior: Normal    Attitude:   Cooperative    Orientation:   Person Place Time Situation   Speech    Rate / Production: Normal/ Responsive    Volume:  Normal    Mood:    Normal   Affect:    Appropriate    Thought Content:  Clear    Thought Form:  Coherent  Logical    Insight:    Good      Medication Review:   No changes to current psychiatric medication(s)    She has seen her PCP for medications and recently saw a new Psychiatrist with [facility] . Her medications  were reviewed.      Medication Compliance:   Yes     Allergies   Allergen Reactions     Sulfa Drugs       Changes in Health Issues:   None reported     Chemical Use Review:   Substance Use: Chemical use reviewed, no active concerns identified      Tobacco Use: No current tobacco use.      Diagnosis:  1. Major depressive disorder, recurrent episode, moderate (H)      Collateral Reports Completed:   Routed note to PCP    PLAN: (Patient Tasks / Therapist Tasks / Other):Reviewed 5/12/2022  Patient will continue to identify at least one pleasurable/meaningful activity to do by herself or someone of her choice.  Patient will involve kids enveloping the rule of the house and reinforce them.   Patient will continue to review and practice the CBT skills sent via my chart  Patient will keep the plan to go to the gym at least 3 times a week for at least 30 minutes each time.   Patient will continue with hands on activities including journaling using GLAD  Patient's next visit is in 2 weeks.    YUMIKO CruzSW   ___________________________________________________________________    Individual Treatment Plan    Patient's Name: Leticia Lawson  YOB: 1985    Date of Creation: 2/25/2022    Date Treatment Plan Last Reviewed/Revised:  2/25/2022    DSM5 Diagnoses: 296.32 (F33.1) Major Depressive Disorder, Recurrent Episode, Moderate _ and With mixed features, 300.02 (F41.1) Generalized Anxiety Disorder or 300.3 (F42) Obsessive Compulsive Disorder    Psychosocial / Contextual Factors:  MDD; TORRI, OCD, adjusting to new life here in [place]. Increased unexplained anger.    PROMIS (reviewed every 90 days): 17;  Completed on 2/04/2022     Referral / Collaboration:  Referral to another professional/service is not indicated at this time..    Anticipated number of session for this episode of care: 12  Anticipation frequency of session: Biweekly  Anticipated Duration of each session: 38-52 minutes  Treatment plan will be  reviewed in 90 days or when goals have been changed.      MeasurableTreatment Goal(s) related to diagnosis / functional impairment(s)  Goal 1:Patient will develop an understanding of how avoidance of the grief process may affect functioning in all areas of functioning.   I will know I've met my goal when I have developed alternative diversions and other coping mechanisms that are related to loss issues by the next review       Objective #A (Patient Action)                          Status: New - Date: 2/25/2022  Patient will talk to at least two others about losses and coping.  Intervention(s)              Therapist will provide some education on how to safely share feelings of loss with others.     Goal 2: Patient will develop healthy cognitive patterns and beliefs about self and the world that lead to alleviation and help prevent the relapse of depression symptoms.     I will know I've met my goal when my level of my depression is reduced from 4/4 to 3/4 or better by the next review.       Objective #A (Patient Action)                          Patient will identify at least 4 stressors which contribute to feelings of depression.  Status: New - Date: 2/25/2022     Intervention(s)  Therapist will teach distraction skills. including seff soothing with the 5 senses.     Objective #B  Patient will Identify negative self-talk and behaviors: challenge core beliefs, myths, and actions.  Status: New - Date: 2/25/2022  Intervention(s)  Therapist will Introduce CBT skills.     Objective #C  Patient will Increase interest, engagement, and pleasure in doing things.  Status: New - Date: 2/25/2022   Intervention(s)  Therapist will Encourage patient to share identify and share thoughts and feelings to increase self confidence .     Goal 3: Client will will stabilize anxiety level while increasing ability to function on a daily basis.     I will know I've met my goal when my anxiety is reduced from 4/4 to 3/4 or better by the next  "review       Objective #A (Client Action)                Client will Increase interest, engagement, and pleasure in doing things.  Status: New - Date: 2/25/2022     Intervention(s)  Therapist will provide educational materials on distraction activities.     Objective #B  Client will identify at least 4 fears / thoughts that contribute to feeling anxious.  Status: New - Date: 2/25/2022     Intervention(s)  Therapist will teach how to challenge negative thoughts using CBT skills including the 3 Cs.     Objective #C  Client will use thought-stopping strategy daily to reduce intrusive thoughts.  Status: New - Date: 2/25/2022     Intervention(s)              Therapist will Teach how to use CBT: 3 Cs to challenge the NTs as they  present.      Patient will reduce the frequency, intensity, and duration of obsession.     I will know I've met my goal when I am able to control my unwanted thoughts, images, or impulses that distress and/ interfere with my daily routine, job performance, or social relationship, at least at 60 % of the time by the next review\"     Objective #A (Patient Action)                          Status: New - Date: 2/25/2022     Patient will limit amount of time spent on repetitive or obsessive thoughts to  3 min or less minutes.     Intervention(s)  Therapist will teach emotional recognition/identification. related to behavior change.  Objective #B  Patient will use cognitive strategies identified in therapy to challenge anxious thoughts.                      Status: New - Date: 2/25/2022     Intervention(s)  Therapist will teach CBT skills to challenge ANTs related to obsession and compulsive behaviors .  Objective #C  Patient will use thought-stopping strategy daily to reduce intrusive thoughts.  Status: New - Date: 2/25/2022  Intervention(s)  Therapist will assist the patient Identify any distortions and emotions that affect patient's productivity.     Goal 4: Client will develop an awareness of angry " "thoughts, feelings, and actions, clarifying origins of, and learning alternatives to aggressive anger.    I will know I've met my goal when the intensity of anger is reduced from 4/4 to 3/4 or better by the next review\"    Objective #A (Client Action)    Status: New - Date: 2/25/2022   Client will use progressive relaxation exercise once in the morning and once in the evening to help relieve tension  practice deep diaphragm breathing once daily for at least 5 minutes to reduce anger / irritability and regain a calmer, more clear state of mind  use anger journal once daily to express any thought distortions or irrational beliefs that contribute to anger or irritation.  Intervention(s)  Therapist will assign homework  teach STAR TIPS, practice CBT skills to reduce anger.    Objective #B  Client will use progressive relaxation exercise once in the morning and once in the evening to help relieve tension.    Status: New - Date: 2/25/2022   Intervention(s)  Therapist will role-play conflict management.    Objective #C  Client will use anger journal once daily to express identified  thought distortions or irrational beliefs that contribute to anger or irritation.  Status: New - Date: 2/25/2022   Intervention(s)  Therapist will provide space for the patient to express her frustration and be able to receiive input to help alleviate her anger.    Patient has reviewed and agreed to the above plan.    ZAHRA Cruz  February 25, 2022    Answers for HPI/ROS submitted by the patient on 3/17/2022  If you checked off any problems, how difficult have these problems made it for you to do your work, take care of things at home, or get along with other people?: Very difficult  PHQ9 TOTAL SCORE: 11  Answers for HPI/ROS submitted by the patient on 4/28/2022  If you checked off any problems, how difficult have these problems made it for you to do your work, take care of things at home, or get along with other people?: Very " difficult  PHQ9 TOTAL SCORE: 9    Answers for HPI/ROS submitted by the patient on 5/12/2022  If you checked off any problems, how difficult have these problems made it for you to do your work, take care of things at home, or get along with other people?: Somewhat difficult  PHQ9 TOTAL SCORE: 6

## 2022-05-13 ASSESSMENT — PATIENT HEALTH QUESTIONNAIRE - PHQ9: SUM OF ALL RESPONSES TO PHQ QUESTIONS 1-9: 6

## 2022-05-26 ENCOUNTER — VIRTUAL VISIT (OUTPATIENT)
Dept: BEHAVIORAL HEALTH | Facility: HOSPITAL | Age: 37
End: 2022-05-26
Payer: COMMERCIAL

## 2022-05-26 DIAGNOSIS — F41.1 GAD (GENERALIZED ANXIETY DISORDER): Primary | ICD-10-CM

## 2022-05-26 PROCEDURE — 90834 PSYTX W PT 45 MINUTES: CPT | Mod: GT | Performed by: SOCIAL WORKER

## 2022-05-26 ASSESSMENT — PATIENT HEALTH QUESTIONNAIRE - PHQ9
SUM OF ALL RESPONSES TO PHQ QUESTIONS 1-9: 6
SUM OF ALL RESPONSES TO PHQ QUESTIONS 1-9: 6
10. IF YOU CHECKED OFF ANY PROBLEMS, HOW DIFFICULT HAVE THESE PROBLEMS MADE IT FOR YOU TO DO YOUR WORK, TAKE CARE OF THINGS AT HOME, OR GET ALONG WITH OTHER PEOPLE: VERY DIFFICULT

## 2022-05-26 NOTE — PROGRESS NOTES
[facility]                                     Progress Note    Patient Name: Leticia Lawson  Date:5/26/2022         Service Type: Individual      Session Start Time: 14:03  Session End Time:14:55     Session Length:52    Session #:  7    Attendees: Client    Service Modality:  Video Visit:      Provider verified identity through the following two step process.  Patient provided:  Patient is known previously to provider    Telemedicine Visit: The patient's condition can be safely assessed and treated via synchronous audio and visual telemedicine encounter.      Reason for Telemedicine Visit: Services only offered telehealth    Originating Site (Patient Location): Patient's home    Distant Site (Provider Location): Provider Remote Setting    Consent:  The patient/guardian has verbally consented to: the potential risks and benefits of telemedicine (video visit) versus in person care; bill my insurance or make self-payment for services provided; and responsibility for payment of non-covered services.     Patient would like the video invitation sent by:  My Chart    Mode of Communication:  Video Conference via Amwell    As the provider I attest to compliance with applicable laws and regulations related to telemedicine.    DATA  Interactive Complexity: No  Crisis: No      Progress Since Last Session (Related to Symptoms / Goals / Homework):   Symptoms: Continues to feel tired but pushing to meet her needs and meet family and work demands.     Homework: Achieved / completed to satisfaction: Did practice the STAR TIPS tool; did some meaningful and enjoyable activity: Yoga      Episode of Care Goals: Satisfactory progress - ACTION (Actively working towards change); Intervened by reinforcing change plan / affirming steps taken     Current / Ongoing Stressors and Concerns: Patient reports doing well.  Has been more active, going to the gym and doing some simpler exercise that  does not affect her joins. Notes small goals are manageable and are helping to complete them. She notes family members started to participate in household activities. Plans to keep up with the plan finalize the rules of the house. Kids are traveling to see their dads. With this patient plans to review her day and things she likes to do to recharge.  Patient has been feeling frustrated by some of the job related issues. She is in touch with her boss to update them. Patient notes it is time to review her medication due to ongoing fatigue possibly from the current medication, Sertraline.      Treatment Objective(s) Addressed in This Session:   Decrease frequency and intensity of feeling down, depressed, hopeless  Identify negative self-talk and behaviors: challenge core beliefs, myths, and actions.  Support will be provided to process some unrealistic/distorted thinking around the presented concerns so patient can challenge them and regain some balance and in her emotions.     Intervention: A TP review was completed today5/26/2022- will keep the same goals.  CBT skills:Reviewed interventions and goals discussed in the previous sessions. Reinforced small accomplishments. Provided encouragement to continue challenging any automatic thought as they present as she meets her personal, family and work responsibilities.      DBT:  Understanding of the states of mind to find a balance and achieve  mood regulation. Understanding states of mind and how they complete each other to help with problem solving and mood regulation. Patient will continue to strive getting to the wise mind state.     Motivational Interviewing: ID concerns and develop changeable solutions     Assessments completed prior to visit: 2/18/2022    The following assessments were completed by patient for this visit:  PHQ9:   PHQ-9 SCORE 2/25/2022 3/17/2022 3/30/2022 3/30/2022 4/28/2022 5/12/2022 5/26/2022   PHQ-9 Total Score MyChart - 11 (Moderate depression) -  9 (Mild depression) 9 (Mild depression) 6 (Mild depression) 6 (Mild depression)   PHQ-9 Total Score 14 11 9 9 9 6 6     GAD7:   TORRI-7 SCORE 8/18/2021 2/4/2022 2/25/2022 3/30/2022 3/30/2022   Total Score - - - - 7 (mild anxiety)   Total Score 10 14 11 7 7     CAGE-AID:   CAGE-AID Total Score 8/18/2021 2/4/2022   Total Score 0 0     PROMIS 10-Global Health (all questions and answers displayed):   PROMIS 10 2/4/2022 5/12/2022 5/26/2022   In general, would you say your health is: - Fair Fair   In general, would you say your quality of life is: - Good Fair   In general, how would you rate your physical health? - Fair Fair   In general, how would you rate your mental health, including your mood and your ability to think? - Good Fair   In general, how would you rate your satisfaction with your social activities and relationships? - Good Good   In general, please rate how well you carry out your usual social activities and roles - Fair Good   To what extent are you able to carry out your everyday physical activities such as walking, climbing stairs, carrying groceries, or moving a chair? - Moderately Moderately   How often have you been bothered by emotional problems such as feeling anxious, depressed or irritable? - Sometimes Often   How would you rate your fatigue on average? - Moderate Moderate   How would you rate your pain on average?   0 = No Pain  to  10 = Worst Imaginable Pain - 4 4   In general, would you say your health is: 1 2 2   In general, would you say your quality of life is: 3 3 2   In general, how would you rate your physical health? 1 2 2   In general, how would you rate your mental health, including your mood and your ability to think? 2 3 2   In general, how would you rate your satisfaction with your social activities and relationships? 3 3 3   In general, please rate how well you carry out your usual social activities and roles. (This includes activities at home, at work and in your community, and  responsibilities as a parent, child, spouse, employee, friend, etc.) 3 2 3   To what extent are you able to carry out your everyday physical activities such as walking, climbing stairs, carrying groceries, or moving a chair? 2 3 3   In the past 7 days, how often have you been bothered by emotional problems such as feeling anxious, depressed, or irritable? 4 3 4   In the past 7 days, how would you rate your fatigue on average? 4 3 3   In the past 7 days, how would you rate your pain on average, where 0 means no pain, and 10 means worst imaginable pain? 7 4 4   Global Mental Health Score 10 12 9   Global Physical Health Score 7 11 11   PROMIS TOTAL - SUBSCORES 17 23 20     [place] Suicide Severity Rating Scale (Short Version)  [place] Suicide Severity Rating (Short Version) 2/4/2022   Over the past 2 weeks have you felt down, depressed, or hopeless? no   Over the past 2 weeks have you had thoughts of killing yourself? no   Have you ever attempted to kill yourself? no       ASSESSMENT: Current Emotional / Mental Status (status of significant symptoms):   Risk status (Self / Other harm or suicidal ideation)   Patient denies current fears or concerns for personal safety.   Patient denies current or recent suicidal ideation or behaviors.   Patient denies current or recent homicidal ideation or behaviors.   Patient denies current or recent self injurious behavior or ideation.   Patient denies other safety concerns.   Patient reports there has been no change in risk factors since their last session.     Patient reports there has been no change in protective factors since their last session.     Recommended that patient call 911 or go to the local ED should there be a change in any of these risk factors.     Appearance:   Appropriate    Eye Contact:   Good    Psychomotor Behavior: Normal    Attitude:   Cooperative    Orientation:   Person Place Time Situation   Speech    Rate / Production: Normal/  Responsive    Volume:  Normal    Mood:    Normal   Affect:    Appropriate    Thought Content:  Clear    Thought Form:  Coherent  Logical    Insight:    Good      Medication Review:   No changes to current psychiatric medication(s)    She has seen her PCP for medications and recently saw a new Psychiatrist with [facility] . Her medications were reviewed.      Medication Compliance:   Yes     Allergies   Allergen Reactions     Sulfa Drugs       Changes in Health Issues:   None reported     Chemical Use Review:   Substance Use: Chemical use reviewed, no active concerns identified      Tobacco Use: No current tobacco use.      Diagnosis:  1. TORRI (generalized anxiety disorder)      Collateral Reports Completed:   Routed note to PCP    PLAN: (Patient Tasks / Therapist Tasks / Other):Reviewed today 5/26/2022: to keep  Patient will continue to identify at least one pleasurable/meaningful activity to do by herself or someone of her choice.  Patient will involve kids developing the rule of the house and reinforce them.   Patient will continue to review and practice the CBT skills sent via my chart  Patient will keep the plan to go to the gym at least 3 times a week for at least 30 minutes each time.   Patient will continue with hands on activities including journaling using GLADAVID  Patient's next visit is in 2 weeks.    ZAHRA Cruz   ___________________________________________________________________    Individual Treatment Plan    Patient's Name: Leticia Lawson  YOB: 1985    Date of Creation: 2/25/2022    Date Treatment Plan Last Reviewed/Revised:  5/26/2022    DSM5 Diagnoses: 296.32 (F33.1) Major Depressive Disorder, Recurrent Episode, Moderate _ and With mixed features, 300.02 (F41.1) Generalized Anxiety Disorder or 698.4 (L98.1) Excoriation (skin picking) Disorder    Psychosocial / Contextual Factors:  MDD; TORRI, OCD, adjusting to new life here in [place]. Increased unexplained  anger.    PROMIS (reviewed every 90 days):20;Completed on 5/26/2022     Referral / Collaboration:  Referral to another professional/service is not indicated at this time..    Anticipated number of session for this episode of care: 12  Anticipation frequency of session: Biweekly  Anticipated Duration of each session: 38-52 minutes  Treatment plan will be reviewed in 90 days or when goals have been changed.      MeasurableTreatment Goal(s) related to diagnosis / functional impairment(s)  Goal 1:Patient will develop an understanding of how avoidance of the grief process may affect functioning in all areas of functioning.   I will know I've met my goal when I have developed alternative diversions and other coping mechanisms that are related to loss issues by the next review       Objective #A (Patient Action)                          Status: New - Date: 5/26/2022  Patient will talk to at least two others about losses and coping.  Intervention(s)              Therapist will provide some education on how to safely share feelings of loss with others.     Goal 2: Patient will develop healthy cognitive patterns and beliefs about self and the world that lead to alleviation and help prevent the relapse of depression symptoms.     I will know I've met my goal when my level of my depression is reduced from 3/4 to 2/4 or better by the next review.       Objective #A (Patient Action)                          Patient will identify at least 4 stressors which contribute to feelings of depression.  Status: New - Date: 5/26/2022     Intervention(s)  Therapist will teach distraction skills. including seff soothing with the 5 senses.     Objective #B  Patient will Identify negative self-talk and behaviors: challenge core beliefs, myths, and actions.  Status: New - Date: 5/26/2022  Intervention(s)  Therapist will Introduce CBT skills.     Objective #C  Patient will Increase interest, engagement, and pleasure in doing things.  Status: New  "- Date: 2/26/2022   Intervention(s)  Therapist will Encourage patient to share identify and share thoughts and feelings to increase self confidence .     Goal 3: Client will will stabilize anxiety level while increasing ability to function on a daily basis.     I will know I've met my goal when my anxiety is reduced from 3/4 to 2/4 or better by the next review       Objective #A (Client Action)                Client will Increase interest, engagement, and pleasure in doing things.  Status: New - Date: 5/26/2022     Intervention(s)  Therapist will provide educational materials on distraction activities.     Objective #B  Client will identify at least 4 fears / thoughts that contribute to feeling anxious.  Status: New - Date: 5/26/2022     Intervention(s)  Therapist will teach how to challenge negative thoughts using CBT skills including the 3 Cs.     Objective #C  Client will use thought-stopping strategy daily to reduce intrusive thoughts.  Status: New - Date: 5/26/2022     Intervention(s)              Therapist will Teach how to use CBT: 3 Cs to challenge the NTs as they  present.      Patient will reduce the frequency, intensity, and duration of obsession.     I will know I've met my goal when I am able to control my unwanted thoughts, images, or impulses that distress and/ interfere with my daily routine, job performance, or social relationship, at least at 70 % of the time by the next review\"     Objective #A (Patient Action)                          Status: New - Date:5/26/2022     Patient will limit amount of time spent on repetitive or obsessive thoughts to  3 min or less minutes.     Intervention(s)  Therapist will teach emotional recognition/identification. related to behavior change.  Objective #B  Patient will use cognitive strategies identified in therapy to challenge anxious thoughts.                      Status: New - Date: 5/26/2022   Intervention(s)  Therapist will teach CBT skills to challenge ANTs " "related to obsession and compulsive behaviors .  Objective #C  Patient will use thought-stopping strategy daily to reduce intrusive thoughts.  Status: New - Date:5/26/2022  Intervention(s)  Therapist will assist the patient Identify any distortions and emotions that affect patient's productivity.     Goal 4: Client will develop an awareness of angry thoughts, feelings, and actions, clarifying origins of, and learning alternatives to aggressive anger.    I will know I've met my goal when the intensity of anger is reduced from 3/4 to 2/4 or better by the next review\"    Objective #A (Client Action)    Status: New - Date:5/26/2022  Client will use progressive relaxation exercise once in the morning and once in the evening to help relieve tension  practice deep diaphragm breathing once daily for at least 5 minutes to reduce anger / irritability and regain a calmer, more clear state of mind  use anger journal once daily to express any thought distortions or irrational beliefs that contribute to anger or irritation.  Intervention(s)  Therapist will assign homework  teach STAR TIPS, practice CBT skills to reduce anger.    Objective #B  Client will use progressive relaxation exercise once in the morning and once in the evening to help relieve tension.    Status: New - Date: 5/26/2022   Intervention(s)  Therapist will role-play conflict management.    Objective #C  Client will use anger journal once daily to express identified  thought distortions or irrational beliefs that contribute to anger or irritation.  Status: New - Date: 5/26/2022   Intervention(s)  Therapist will provide space for the patient to express her frustration and be able to receiive input to help alleviate her anger.    Patient has reviewed and agreed to the above plan .    ZAHRA Cruz  May 26, 2022    Answers for HPI/ROS submitted by the patient on 3/17/2022  If you checked off any problems, how difficult have these problems made it for you " to do your work, take care of things at home, or get along with other people?: Very difficult  PHQ9 TOTAL SCORE: 11  Answers for HPI/ROS submitted by the patient on 4/28/2022  If you checked off any problems, how difficult have these problems made it for you to do your work, take care of things at home, or get along with other people?: Very difficult  PHQ9 TOTAL SCORE: 9    Answers for HPI/ROS submitted by the patient on 5/12/2022  If you checked off any problems, how difficult have these problems made it for you to do your work, take care of things at home, or get along with other people?: Somewhat difficult  PHQ9 TOTAL SCORE: 6    Answers for HPI/ROS submitted by the patient on 5/26/2022  If you checked off any problems, how difficult have these problems made it for you to do your work, take care of things at home, or get along with other people?: Very difficult  PHQ9 TOTAL SCORE: 6

## 2022-06-16 ENCOUNTER — OFFICE VISIT (OUTPATIENT)
Dept: NEUROLOGY | Facility: CLINIC | Age: 37
End: 2022-06-16

## 2022-06-16 ENCOUNTER — OFFICE VISIT (OUTPATIENT)
Dept: NEUROLOGY | Facility: CLINIC | Age: 37
End: 2022-06-16
Attending: PSYCHIATRY & NEUROLOGY
Payer: COMMERCIAL

## 2022-06-16 VITALS
SYSTOLIC BLOOD PRESSURE: 108 MMHG | DIASTOLIC BLOOD PRESSURE: 67 MMHG | HEART RATE: 74 BPM | BODY MASS INDEX: 24.73 KG/M2 | WEIGHT: 131 LBS | HEIGHT: 61 IN

## 2022-06-16 DIAGNOSIS — M62.81 GENERALIZED MUSCLE WEAKNESS: ICD-10-CM

## 2022-06-16 DIAGNOSIS — M62.81 GENERALIZED MUSCLE WEAKNESS: Primary | ICD-10-CM

## 2022-06-16 DIAGNOSIS — R56.9 SEIZURE-LIKE ACTIVITY (H): ICD-10-CM

## 2022-06-16 PROCEDURE — 95886 MUSC TEST DONE W/N TEST COMP: CPT | Mod: RT | Performed by: PSYCHIATRY & NEUROLOGY

## 2022-06-16 PROCEDURE — 99213 OFFICE O/P EST LOW 20 MIN: CPT | Performed by: PSYCHIATRY & NEUROLOGY

## 2022-06-16 PROCEDURE — 95911 NRV CNDJ TEST 9-10 STUDIES: CPT | Performed by: PSYCHIATRY & NEUROLOGY

## 2022-06-16 RX ORDER — VENLAFAXINE HYDROCHLORIDE 75 MG/1
75 TABLET, EXTENDED RELEASE ORAL DAILY
COMMUNITY
Start: 2022-06-09 | End: 2022-12-14

## 2022-06-16 NOTE — PROGRESS NOTES
NEUROLOGY FOLLOW UP VISIT  NOTE       [facility] NEUROLOGY [place]  [address] [place] [address]  Tel: [phone]  Fax: [phone]  [email or web address]     Leticia Lawson,  1985, MRN [id number]  PCP: Liya Chaudhry  Date: 2022      ASSESSMENT & PLAN     Visit Diagnosis  1. Generalized muscle weakness  2. Seizure-like activity     Seizure-like activity  Pleasant 36-year-old female with severe recurrent depression, TORRI, OCD who was referred for progressive generalized weakness, shaking in her legs at times along with twitching in the lips.  She had extensive work-up that includes MRI brain, lumbar spine, thoracic spine and cervical spine that were unremarkable.  Cervical spine MRI did showed mild stenosis at C6-C7.  Lab work included normal RPR, TSH, B6, B12, B1, methylmalonic acid level, folate and CK.  She is shared over video today in which she has repeated involuntary twitching on the right lower lip specially when she stretches her mouth.  She reports similar twitching in her legs and has noted previously extensive lab work was normal except for findings to suggest she was positive for mono.  I have recommended checking sleep deprived EEG to rule out focal seizure as possible etiology and if negative I will schedule her for a 24-hour EEG.  Follow-up will be the day she gets EEG    Thank you again for this referral, please feel free to contact me if you have any questions.    Alex Zhang MD  [facility]  (Formerly, [facility]     HISTORY OF PRESENT ILLNESS     Patient is a 36-year-old female with history of severe recurrent depression, TORRI, OCD who was initially evaluated on 2022 for generalized weakness, shaking in the legs and difficulty going up or down the stairs.  She had MRI of the brain and lumbar spine that were normal and extensive lab work was normal also except finding to suggest  positive mononucleosis.  Although I suspected her symptoms were due to her underlying mental health issues I did order MRI of cervical and thoracic spine that showed mild stenosis at C6-C7 with moderate to severe left and mild to moderate right neuroforaminal stenosis.  At C5-6 there was mild stenosis.  Thoracic spine MRI was normal lab work included RPR, TSH, B6, B12, B1, methylmalonic acid level, folate and CK.  EMG was within normal limits.  She today she had a video that she made during 1 of such episodes.  As she stretches her lips her right lower lip start twitching involuntarily.  This happens at random.  She also has similar twitches in her legs and does feel somewhat dizzy but denies loss of consciousness, tongue biting or any bowel or bladder incontinence     PROBLEM LIST   Patient Active Problem List   Diagnosis Code     Obsessive-compulsive disorder, unspecified type F42.9     Severe episode of recurrent major depressive disorder, without psychotic features (H) F33.2     TORRI (generalized anxiety disorder) F41.1     Thyroid nodule E04.1     ASCUS of cervix with negative high risk HPV R87.610     Positive FIONA (antinuclear antibody) R76.8         PAST MEDICAL & SURGICAL HISTORY     Past Medical History:   Patient  has a past medical history of Abnormal Pap smear of cervix (08/18/2021), Depressive disorder (2001), and Thyroid nodule.    Surgical History:  She  has a past surgical history that includes tubal ligation (04/19/2019); Egd; d & c (2010); Abdomen surgery (2019); and GI surgery (2011).     SOCIAL HISTORY     Reviewed, and she  reports that she quit smoking about 7 years ago. Her smoking use included vaping device. She started smoking about 21 years ago. She smoked 0.00 packs per day for 0.00 years. She has never used smokeless tobacco. She reports current alcohol use. She reports that she does not use drugs.     FAMILY HISTORY     Reviewed, and family history includes Alzheimer Disease in her  "maternal grandmother; Anxiety Disorder in an other family member; Arthritis in her mother; Asthma in an other family member; Bipolar Disorder in her sister; Bone Cancer in her paternal grandmother; Breast Cancer in her paternal grandmother; Depression in her mother and another family member; Emphysema in her paternal grandfather; Hyperlipidemia in her maternal grandmother and mother; Lung Cancer in her maternal grandfather; Mental Illness in her sister; Ovarian Cancer in her mother; Thyroid Disease in her mother.     ALLERGIES     Allergies   Allergen Reactions     Sulfa Drugs          REVIEW OF SYSTEMS     A 12 point review of system was performed and was negative except as outlined in the history of present illness.     HOME MEDICATIONS     Current Outpatient Rx   Medication Sig Dispense Refill     meloxicam (MOBIC) 7.5 MG tablet Take 1/4 tab twice per day 60 tablet 2     metoclopramide (REGLAN) 10 MG tablet        venlafaxine (EFFEXOR-ER) 75 MG 24 hr tablet Take 75 mg by mouth daily       cyclobenzaprine (FLEXERIL) 5 MG tablet Take 1/2-1 tab prior to bedtime, prn.  If still symptomatic or if still not sleeping well and well-tolerated can increase by half tablet (half milligram increments) up to 2 tablets prior to bedtime, as needed. 45 tablet 2         PHYSICAL EXAM     Vital signs  /67 (BP Location: Right arm, Patient Position: Sitting)   Pulse 74   Ht 1.549 m (5' 1\")   Wt 59.4 kg (131 lb)   BMI 24.75 kg/m      Weight:   131 lbs 0 oz    Patient is alert and oriented x3 vital signs are reviewed and are documented in electronic medical record. Neck supple no bruits noted chest clear neurologically speech mentation and affect was normal. Cranial nerves II through XII are intact motor strength 5/5 reflexes 2+ toes downgoing. Sensation intact. Minimal dysmetria on finger-nose testing gait testing normal she is able to walk on her toes and heels without any difficulty. Romberg negative.      PERTINENT " DIAGNOSTIC STUDIES     Following studies were reviewed:     MRI CERVICAL SPINE 4/29/2022  1.  Normal cervical spinal cord. No pathologic enhancement.  2.  Degenerative changes most pronounced at C5-C6 and C6-C7.  3.  At C6-C7, there is mild spinal canal stenosis with moderate to  severe left and mild/moderate right foraminal narrowing. Correlate for  any C7 symptoms.  4.  At C5-C6, there is mild spinal canal and right foraminal  narrowing.    MRI THORACIC SPINE 4/29/2022  Normal thoracic spine MRI.     MRI BRAIN 11/11/2021  1.  No acute intracranial process.  2.  A couple foci of T2/FLAIR hyperintensity within the cerebral white matter may reflect minor sequela of previous microvascular ischemic or inflammatory insults and have also been described in the setting of chronic migraine headaches.     MRI LUMBAR SPINE 3/29/2022  1. Mild disc degeneration at L5-S1 and facet arthropathy at L4-5. No canal or foraminal stenosis.   2. The remaining levels appear normal.     PERTINENT LABS  Following labs were reviewed:  Hospital Outpatient Visit on 04/20/2022   Component Date Value     LVEF  04/20/2022 55-60%    Lab on 04/18/2022   Component Date Value     CK 04/18/2022 54      Folic Acid 04/18/2022 3.6      Methylmalonic Acid 04/18/2022 0.25      Vitamin B1 Whole Blood L* 04/18/2022 96      Vitamin B12 04/18/2022 497      Vitamin B6 04/18/2022 39.3      TSH 04/18/2022 1.10      Treponema Antibody Total 04/18/2022 Nonreactive          Total time spent for face to face visit, reviewing labs/imaging studies, counseling and coordination of care was: 20 min spent on the date of the encounter doing chart review, review of outside records, review of test results, interpretation of tests, patient visit and documentation       This note was dictated using voice recognition software.  Any grammatical or context distortions are unintentional and inherent to the software.    No orders of the defined types were placed in this  encounter.     New Prescriptions    No medications on file     Modified Medications    No medications on file

## 2022-06-16 NOTE — LETTER
2022         RE: Leticia Lawson  [address]  [place] [address]        Dear Colleague,    Thank you for referring your patient, Leticia Lawson, to the [facility] NEUROLOGY CLINIC [place]. Please see a copy of my visit note below.    NEUROLOGY FOLLOW UP VISIT  NOTE       [facility] NEUROLOGY [place]  [address] [place] [address]  Tel: [phone]  Fax: [phone]  [email or web address]     Leticia Lawson,  1985, MRN [id number]  PCP: Liya Chaudhry  Date: 2022      ASSESSMENT & PLAN     Visit Diagnosis  1. Generalized muscle weakness  2. Seizure-like activity     Seizure-like activity  Pleasant 36-year-old female with severe recurrent depression, TORRI, OCD who was referred for progressive generalized weakness, shaking in her legs at times along with twitching in the lips.  She had extensive work-up that includes MRI brain, lumbar spine, thoracic spine and cervical spine that were unremarkable.  Cervical spine MRI did showed mild stenosis at C6-C7.  Lab work included normal RPR, TSH, B6, B12, B1, methylmalonic acid level, folate and CK.  She is shared over video today in which she has repeated involuntary twitching on the right lower lip specially when she stretches her mouth.  She reports similar twitching in her legs and has noted previously extensive lab work was normal except for findings to suggest she was positive for mono.  I have recommended checking sleep deprived EEG to rule out focal seizure as possible etiology and if negative I will schedule her for a 24-hour EEG.  Follow-up will be the day she gets EEG    Thank you again for this referral, please feel free to contact me if you have any questions.    Alex Zhang MD  [facility]  (Former[facility], [facility]     HISTORY OF PRESENT ILLNESS     Patient is a 36-year-old female with history of severe recurrent depression, TORRI,  OCD who was initially evaluated on 4/18/2022 for generalized weakness, shaking in the legs and difficulty going up or down the stairs.  She had MRI of the brain and lumbar spine that were normal and extensive lab work was normal also except finding to suggest positive mononucleosis.  Although I suspected her symptoms were due to her underlying mental health issues I did order MRI of cervical and thoracic spine that showed mild stenosis at C6-C7 with moderate to severe left and mild to moderate right neuroforaminal stenosis.  At C5-6 there was mild stenosis.  Thoracic spine MRI was normal lab work included RPR, TSH, B6, B12, B1, methylmalonic acid level, folate and CK.  EMG was within normal limits.  She today she had a video that she made during 1 of such episodes.  As she stretches her lips her right lower lip start twitching involuntarily.  This happens at random.  She also has similar twitches in her legs and does feel somewhat dizzy but denies loss of consciousness, tongue biting or any bowel or bladder incontinence     PROBLEM LIST   Patient Active Problem List   Diagnosis Code     Obsessive-compulsive disorder, unspecified type F42.9     Severe episode of recurrent major depressive disorder, without psychotic features (H) F33.2     TORRI (generalized anxiety disorder) F41.1     Thyroid nodule E04.1     ASCUS of cervix with negative high risk HPV R87.610     Positive FIONA (antinuclear antibody) R76.8         PAST MEDICAL & SURGICAL HISTORY     Past Medical History:   Patient  has a past medical history of Abnormal Pap smear of cervix (08/18/2021), Depressive disorder (2001), and Thyroid nodule.    Surgical History:  She  has a past surgical history that includes tubal ligation (04/19/2019); Egd; d & c (2010); Abdomen surgery (2019); and GI surgery (2011).     SOCIAL HISTORY     Reviewed, and she  reports that she quit smoking about 7 years ago. Her smoking use included vaping device. She started smoking about 21  "years ago. She smoked 0.00 packs per day for 0.00 years. She has never used smokeless tobacco. She reports current alcohol use. She reports that she does not use drugs.     FAMILY HISTORY     Reviewed, and family history includes Alzheimer Disease in her maternal grandmother; Anxiety Disorder in an other family member; Arthritis in her mother; Asthma in an other family member; Bipolar Disorder in her sister; Bone Cancer in her paternal grandmother; Breast Cancer in her paternal grandmother; Depression in her mother and another family member; Emphysema in her paternal grandfather; Hyperlipidemia in her maternal grandmother and mother; Lung Cancer in her maternal grandfather; Mental Illness in her sister; Ovarian Cancer in her mother; Thyroid Disease in her mother.     ALLERGIES     Allergies   Allergen Reactions     Sulfa Drugs          REVIEW OF SYSTEMS     A 12 point review of system was performed and was negative except as outlined in the history of present illness.     HOME MEDICATIONS     Current Outpatient Rx   Medication Sig Dispense Refill     meloxicam (MOBIC) 7.5 MG tablet Take 1/4 tab twice per day 60 tablet 2     metoclopramide (REGLAN) 10 MG tablet        venlafaxine (EFFEXOR-ER) 75 MG 24 hr tablet Take 75 mg by mouth daily       cyclobenzaprine (FLEXERIL) 5 MG tablet Take 1/2-1 tab prior to bedtime, prn.  If still symptomatic or if still not sleeping well and well-tolerated can increase by half tablet (half milligram increments) up to 2 tablets prior to bedtime, as needed. 45 tablet 2         PHYSICAL EXAM     Vital signs  /67 (BP Location: Right arm, Patient Position: Sitting)   Pulse 74   Ht 1.549 m (5' 1\")   Wt 59.4 kg (131 lb)   BMI 24.75 kg/m      Weight:   131 lbs 0 oz    Patient is alert and oriented x3 vital signs are reviewed and are documented in electronic medical record. Neck supple no bruits noted chest clear neurologically speech mentation and affect was normal. Cranial nerves II " through XII are intact motor strength 5/5 reflexes 2+ toes downgoing. Sensation intact. Minimal dysmetria on finger-nose testing gait testing normal she is able to walk on her toes and heels without any difficulty. Romberg negative.      PERTINENT DIAGNOSTIC STUDIES     Following studies were reviewed:     MRI CERVICAL SPINE 4/29/2022  1.  Normal cervical spinal cord. No pathologic enhancement.  2.  Degenerative changes most pronounced at C5-C6 and C6-C7.  3.  At C6-C7, there is mild spinal canal stenosis with moderate to  severe left and mild/moderate right foraminal narrowing. Correlate for  any C7 symptoms.  4.  At C5-C6, there is mild spinal canal and right foraminal  narrowing.    MRI THORACIC SPINE 4/29/2022  Normal thoracic spine MRI.     MRI BRAIN 11/11/2021  1.  No acute intracranial process.  2.  A couple foci of T2/FLAIR hyperintensity within the cerebral white matter may reflect minor sequela of previous microvascular ischemic or inflammatory insults and have also been described in the setting of chronic migraine headaches.     MRI LUMBAR SPINE 3/29/2022  1. Mild disc degeneration at L5-S1 and facet arthropathy at L4-5. No canal or foraminal stenosis.   2. The remaining levels appear normal.     PERTINENT LABS  Following labs were reviewed:  Hospital Outpatient Visit on 04/20/2022   Component Date Value     LVEF  04/20/2022 55-60%    Lab on 04/18/2022   Component Date Value     CK 04/18/2022 54      Folic Acid 04/18/2022 3.6      Methylmalonic Acid 04/18/2022 0.25      Vitamin B1 Whole Blood L* 04/18/2022 96      Vitamin B12 04/18/2022 497      Vitamin B6 04/18/2022 39.3      TSH 04/18/2022 1.10      Treponema Antibody Total 04/18/2022 Nonreactive          Total time spent for face to face visit, reviewing labs/imaging studies, counseling and coordination of care was: 20 min spent on the date of the encounter doing chart review, review of outside records, review of test results, interpretation of tests,  patient visit and documentation       This note was dictated using voice recognition software.  Any grammatical or context distortions are unintentional and inherent to the software.    No orders of the defined types were placed in this encounter.     New Prescriptions    No medications on file     Modified Medications    No medications on file                     Again, thank you for allowing me to participate in the care of your patient.        Sincerely,        Alex Zhang MD

## 2022-06-16 NOTE — LETTER
6/16/2022         RE: Leticia Lawson  [address]  [place] [address]        Dear Colleague,    Thank you for referring your patient, Leticia Lawson, to the [facility] NEUROLOGY CLINIC [place]. Please see a copy of my visit note below.    See procedure note for EMG report      Again, thank you for allowing me to participate in the care of your patient.        Sincerely,        Alex Zhang MD     yes

## 2022-06-16 NOTE — PROCEDURES
ELECTROMYOGRAPHY (EMG) REPORT       [facility] NEUROLOGY [place]  [address] [place] [address]  Tel: [phone]  Fax: [phone]  [email or web address]     Leticia Lawson,  1985, MRN [id number]  PCP: Liya Chaudhry  Date: 2022     Principal Diagnosis: Generalized muscle weakness     Height: 5 feet 1 inch  Reason for referral: Evaluate right upper/right lower. c/o pain, weakness in both arms for 2 years. Weakness in both legs for a year. Right > Left.       Motor NCS      Nerve / Sites Lat Amp Dist Sky    ms mV cm m/s   R Median - APB      Wrist 2.97 11.9 7       Elbow 6.72 11.5 21.5 57   R Ulnar - ADM      Wrist 2.03 11.1 7       B.Elbow 4.53 10.6 16 64      A.Elbow 6.35 9.7 11 60   R Peroneal - EDB      Ankle 3.70 3.1 8       Fib head 10.00 2.7 26.5 42      Pop fossa 12.60 2.6 11 42   R Tibial - AH      Ankle 4.22 22.9 8       Pop fossa 11.09 21.2 35 51       F  Wave      Nerve Fmin    ms   R Ulnar - ADM 24.79   R Tibial - AH 44.53       Sensory NCS      Nerve / Sites Onset Lat Pk Lat Amp.2-3 Dist Sky Lat Diff    ms ms  V cm m/s ms   R Median - II (Antidr)      Wrist 2.08 2.76 80.2 13 62    R Ulnar - V (Antidr)      Wrist 1.88 2.71 41.8 11 59    R Median, Ulnar - Transcarpal comparison      Median Palm 1.30 1.77 128.1 8 61       Ulnar Palm 1.09 1.51 96.2 8 73          0.26   R Sural - Ankle (Calf)      Calf 2.71 3.54 36.2 14 52    R Superficial peroneal - Ankle      Lat leg 2.34 2.97 12.1 12 51        EMG Summary Table     Spontaneous MUAP Rcmt Note   Muscle Fib PSW Fasc IA # Amp Dur PPP Rate Type   R. Gluteus medius None None None N N N N N N N   R. Gluteus jessica None None None N N N N N N N   R. Upper paraspinal None None None N N N N N N N   R. Middle paraspinal None None None N N N N N N N   R. Lower paraspinal None None None N N N N N N N   R. Adductor mick None None None N N N N N N N   R. Iliopsoas None None None N N N N N N N   R. Quadriceps None  None None N N N N N N N   R. Tibialis anterior None None None N N N N N N N   R. Gastrocnemius (Medial head) None None None N N N N N N N   R. Brachioradialis None None None N N N N N N N   R. Pronator teres None None None N N N N N N N   R. Biceps brachii None None None N N N N N N N   R. Deltoid None None None N N N N N N N   R. Triceps brachii None None None N N N N N N N   R. First dorsal interosseous None None None N N N N N N N   R. Abductor pollicis brevis None None None N N N N N N N        Summary: Nerve conduction and EMG study of right upper and lower extremities shows:  1. Normal right median, ulnar, peroneal and tibial distal motor latencies, amplitude and conduction velocities  2. Normal right ulnar and tibial F latency  3. Normal right median, ulnar, sural and superficial peroneal SNAP  4. Disposable, monopolar needle exam was within normal limits    Impression:   This is a normal nerve conduction and EMG study of right upper and lower extremities.      Alex Zhang MD  [facility]  (Formerly, [facility]      This note was dictated using voice recognition software.  Any grammatical or context distortions are unintentional and inherent to the software.

## 2022-06-16 NOTE — NURSING NOTE
Chief Complaint   Patient presents with     Generalized Weakness     Labs, MRI and EMG results      Azalea Hall CMA on 6/16/2022 at 12:50 PM

## 2022-06-19 NOTE — PROGRESS NOTES
NEUROLOGY FOLLOW UP VISIT  NOTE       [facility] [place]  [address] [place] [address]  Tel: [phone]  Fax: [phone]  [email or web address]     Leticia Lawson,  1985, MRN [id number]  PCP: Liya Chaudhry  Date: 2022      ASSESSMENT & PLAN     Visit Diagnosis  1. Seizure-like activity (H)     Seizure-like activity  Pleasant 36-year-old female with history of recurrent depression, TORRI, OCD who was evaluated for intermittent left leg and left lower lip twitching.  She had extensive work-up including lab work that included normal  normal RPR, TSH, B12, B1, B6, MMA, folate and CK.  Also, MRI brain, lumbar spine, thoracic spine, cervical spine and EEG were normal.  Additionally, EMG of lower extremities was done that was normal.  I have recommended 24-hour EEG to rule out focal seizure.  Follow-up will be after above test    Thank you again for this referral, please feel free to contact me if you have any questions.    Alex Zhang MD  [facility]  (Formerly, [facility]     HISTORY OF PRESENT ILLNESS     Patient is a pleasant 36-year-old female with history of recurrent depression, TORRI, OCD who was evaluated for progressive generalized weakness, shaking in her legs at times associated with twitching in her lips.  During her last visit she she had a home video in which she had repeated involuntary twitching on the left lower lip specially when she stretches her mouth (IN PREVIOUS VISIT IT WAS LISTED ERRONEOUSLY AS THE RIGHT LIP AS PATIENT HAD NOT MENTIONED THAT SHE WAS RECORDING HER IMAGE IN THE MIRROR).  She also reported similar symptoms in her legs and today she had another video during which her left leg is involuntarily twitching while driving.  No history of loss of consciousness.  She had extensive work-up that included MRI brain, MRI lumbar spine, thoracic spine and cervical spine that  were unremarkable.  Minimal spinal stenosis was noted at C6-C7.  Lab work included normal RPR, TSH, B12, B1, B6, MMA, folate and CK.  EEG done today was within normal limit.  She also had EMG that was within normal limits.  Previously I suspected her symptoms were due to her underlying mental health issues but video that she shared raise the possibility of focal motor simple seizures.  My plan was to get 24-hour EEG if routine EEG is normal.     PROBLEM LIST   Patient Active Problem List   Diagnosis Code     Obsessive-compulsive disorder, unspecified type F42.9     Severe episode of recurrent major depressive disorder, without psychotic features (H) F33.2     TORRI (generalized anxiety disorder) F41.1     Thyroid nodule E04.1     ASCUS of cervix with negative high risk HPV R87.610     Positive FIONA (antinuclear antibody) R76.8         PAST MEDICAL & SURGICAL HISTORY     Past Medical History:   Patient  has a past medical history of Abnormal Pap smear of cervix (08/18/2021), Depressive disorder (2001), and Thyroid nodule.    Surgical History:  She  has a past surgical history that includes tubal ligation (04/19/2019); Egd; d & c (2010); Abdomen surgery (2019); and GI surgery (2011).     SOCIAL HISTORY     Reviewed, and she  reports that she quit smoking about 7 years ago. Her smoking use included vaping device. She started smoking about 21 years ago. She smoked 0.00 packs per day for 0.00 years. She has never used smokeless tobacco. She reports current alcohol use. She reports that she does not use drugs.     FAMILY HISTORY     Reviewed, and family history includes Alzheimer Disease in her maternal grandmother; Anxiety Disorder in an other family member; Arthritis in her mother; Asthma in an other family member; Bipolar Disorder in her sister; Bone Cancer in her paternal grandmother; Breast Cancer in her paternal grandmother; Depression in her mother and another family member; Emphysema in her paternal grandfather;  "Hyperlipidemia in her maternal grandmother and mother; Lung Cancer in her maternal grandfather; Mental Illness in her sister; Ovarian Cancer in her mother; Thyroid Disease in her mother.     ALLERGIES     Allergies   Allergen Reactions     Sulfa Drugs          REVIEW OF SYSTEMS     A 12 point review of system was performed and was negative except as outlined in the history of present illness.     HOME MEDICATIONS     Current Outpatient Rx   Medication Sig Dispense Refill     cyclobenzaprine (FLEXERIL) 5 MG tablet Take 1/2-1 tab prior to bedtime, prn.  If still symptomatic or if still not sleeping well and well-tolerated can increase by half tablet (half milligram increments) up to 2 tablets prior to bedtime, as needed. 45 tablet 2     meloxicam (MOBIC) 7.5 MG tablet Take 1/4 tab twice per day 60 tablet 2     metoclopramide (REGLAN) 10 MG tablet        venlafaxine (EFFEXOR-ER) 75 MG 24 hr tablet Take 75 mg by mouth daily           PHYSICAL EXAM     Vital signs  /67 (BP Location: Right arm, Patient Position: Sitting)   Pulse 69   Ht 1.549 m (5' 1\")   Wt 59.4 kg (131 lb)   BMI 24.75 kg/m      Weight:   131 lbs 0 oz    Patient is alert and oriented x3 vital signs are reviewed and are documented in electronic medical record. Neck supple no bruits noted chest clear neurologically speech mentation and affect was normal. Cranial nerves II through XII are intact motor strength 5/5 reflexes 2+ toes downgoing. Sensation intact. Minimal dysmetria on finger-nose testing gait testing normal she is able to walk on her toes and heels without any difficulty. Romberg negative.      PERTINENT DIAGNOSTIC STUDIES     Following studies were reviewed:     MRI CERVICAL SPINE 4/29/2022  1.  Normal cervical spinal cord. No pathologic enhancement.  2.  Degenerative changes most pronounced at C5-C6 and C6-C7.  3.  At C6-C7, there is mild spinal canal stenosis with moderate to  severe left and mild/moderate right foraminal narrowing. " Correlate for  any C7 symptoms.  4.  At C5-C6, there is mild spinal canal and right foraminal  narrowing.     MRI THORACIC SPINE 4/29/2022  Normal thoracic spine MRI.      MRI BRAIN 11/11/2021  1.  No acute intracranial process.  2.  A couple foci of T2/FLAIR hyperintensity within the cerebral white matter may reflect minor sequela of previous microvascular ischemic or inflammatory insults and have also been described in the setting of chronic migraine headaches.     MRI LUMBAR SPINE 3/29/2022  1. Mild disc degeneration at L5-S1 and facet arthropathy at L4-5. No canal or foraminal stenosis.   2. The remaining levels appear normal.    EMG 6/16/2022  This is a normal nerve conduction and EMG study of right upper and lower extremities.    EEG 6/20/2022  This is a normal awake and drowsy EEG.  Please note that the absence of epileptiform abnormalities on EEG does not rule out the possibility of seizures.     PERTINENT LABS  Following labs were reviewed:  Hospital Outpatient Visit on 04/20/2022   Component Date Value     LVEF  04/20/2022 55-60%    Lab on 04/18/2022   Component Date Value     CK 04/18/2022 54      Folic Acid 04/18/2022 3.6      Methylmalonic Acid 04/18/2022 0.25      Vitamin B1 Whole Blood L* 04/18/2022 96      Vitamin B12 04/18/2022 497      Vitamin B6 04/18/2022 39.3      TSH 04/18/2022 1.10      Treponema Antibody Total 04/18/2022 Nonreactive          Total time spent for face to face visit, reviewing labs/imaging studies, counseling and coordination of care was: 30 Minutes spent on the date of the encounter doing chart review, review of outside records, review of test results, interpretation of tests, patient visit and documentation       This note was dictated using voice recognition software.  Any grammatical or context distortions are unintentional and inherent to the software.    Orders Placed This Encounter   Procedures     EEG Ambulatory Unmonitored 12-26 hrs      New Prescriptions    No  medications on file     Modified Medications    No medications on file

## 2022-06-20 ENCOUNTER — ANCILLARY PROCEDURE (OUTPATIENT)
Dept: NEUROLOGY | Facility: CLINIC | Age: 37
End: 2022-06-20
Attending: PSYCHIATRY & NEUROLOGY
Payer: COMMERCIAL

## 2022-06-20 ENCOUNTER — OFFICE VISIT (OUTPATIENT)
Dept: NEUROLOGY | Facility: CLINIC | Age: 37
End: 2022-06-20

## 2022-06-20 VITALS
SYSTOLIC BLOOD PRESSURE: 113 MMHG | WEIGHT: 131 LBS | HEART RATE: 69 BPM | BODY MASS INDEX: 24.73 KG/M2 | DIASTOLIC BLOOD PRESSURE: 67 MMHG | HEIGHT: 61 IN

## 2022-06-20 DIAGNOSIS — R56.9 SEIZURE-LIKE ACTIVITY (H): ICD-10-CM

## 2022-06-20 DIAGNOSIS — R56.9 SEIZURE-LIKE ACTIVITY (H): Primary | ICD-10-CM

## 2022-06-20 PROCEDURE — 95812 EEG 41-60 MINUTES: CPT | Performed by: PSYCHIATRY & NEUROLOGY

## 2022-06-20 PROCEDURE — 99213 OFFICE O/P EST LOW 20 MIN: CPT | Performed by: PSYCHIATRY & NEUROLOGY

## 2022-06-20 NOTE — LETTER
2022         RE: Leticia Lawson  [address]  [place] [address]        Dear Colleague,    Thank you for referring your patient, Leticia Lawson, to the [facility] [place]. Please see a copy of my visit note below.    NEUROLOGY FOLLOW UP VISIT  NOTE       [facility] [place]  [address] [place] [address]  Tel: [phone]  Fax: [phone]  [email or web address]     Leticia Lawson,  1985, MRN [id number]  PCP: Liya Chaudhry  Date: 2022      ASSESSMENT & PLAN     Visit Diagnosis  1. Seizure-like activity (H)     Seizure-like activity  Pleasant 36-year-old female with history of recurrent depression, TORRI, OCD who was evaluated for intermittent left leg and left lower lip twitching.  She had extensive work-up including lab work that included normal  normal RPR, TSH, B12, B1, B6, MMA, folate and CK.  Also, MRI brain, lumbar spine, thoracic spine, cervical spine and EEG were normal.  Additionally, EMG of lower extremities was done that was normal.  I have recommended 24-hour EEG to rule out focal seizure.  Follow-up will be after above test    Thank you again for this referral, please feel free to contact me if you have any questions.    Alex Zhang MD  [facility]  (Formerly, [facility]     HISTORY OF PRESENT ILLNESS     Patient is a pleasant 36-year-old female with history of recurrent depression, TORRI, OCD who was evaluated for progressive generalized weakness, shaking in her legs at times associated with twitching in her lips.  During her last visit she she had a home video in which she had repeated involuntary twitching on the left lower lip specially when she stretches her mouth (IN PREVIOUS VISIT IT WAS LISTED ERRONEOUSLY AS THE RIGHT LIP AS PATIENT HAD NOT MENTIONED THAT SHE WAS RECORDING HER IMAGE IN THE MIRROR).  She also reported similar symptoms  in her legs and today she had another video during which her left leg is involuntarily twitching while driving.  No history of loss of consciousness.  She had extensive work-up that included MRI brain, MRI lumbar spine, thoracic spine and cervical spine that were unremarkable.  Minimal spinal stenosis was noted at C6-C7.  Lab work included normal RPR, TSH, B12, B1, B6, MMA, folate and CK.  EEG done today was within normal limit.  She also had EMG that was within normal limits.  Previously I suspected her symptoms were due to her underlying mental health issues but video that she shared raise the possibility of focal motor simple seizures.  My plan was to get 24-hour EEG if routine EEG is normal.     PROBLEM LIST   Patient Active Problem List   Diagnosis Code     Obsessive-compulsive disorder, unspecified type F42.9     Severe episode of recurrent major depressive disorder, without psychotic features (H) F33.2     TORRI (generalized anxiety disorder) F41.1     Thyroid nodule E04.1     ASCUS of cervix with negative high risk HPV R87.610     Positive FIONA (antinuclear antibody) R76.8         PAST MEDICAL & SURGICAL HISTORY     Past Medical History:   Patient  has a past medical history of Abnormal Pap smear of cervix (08/18/2021), Depressive disorder (2001), and Thyroid nodule.    Surgical History:  She  has a past surgical history that includes tubal ligation (04/19/2019); Egd; d & c (2010); Abdomen surgery (2019); and GI surgery (2011).     SOCIAL HISTORY     Reviewed, and she  reports that she quit smoking about 7 years ago. Her smoking use included vaping device. She started smoking about 21 years ago. She smoked 0.00 packs per day for 0.00 years. She has never used smokeless tobacco. She reports current alcohol use. She reports that she does not use drugs.     FAMILY HISTORY     Reviewed, and family history includes Alzheimer Disease in her maternal grandmother; Anxiety Disorder in an other family member; Arthritis  "in her mother; Asthma in an other family member; Bipolar Disorder in her sister; Bone Cancer in her paternal grandmother; Breast Cancer in her paternal grandmother; Depression in her mother and another family member; Emphysema in her paternal grandfather; Hyperlipidemia in her maternal grandmother and mother; Lung Cancer in her maternal grandfather; Mental Illness in her sister; Ovarian Cancer in her mother; Thyroid Disease in her mother.     ALLERGIES     Allergies   Allergen Reactions     Sulfa Drugs          REVIEW OF SYSTEMS     A 12 point review of system was performed and was negative except as outlined in the history of present illness.     HOME MEDICATIONS     Current Outpatient Rx   Medication Sig Dispense Refill     cyclobenzaprine (FLEXERIL) 5 MG tablet Take 1/2-1 tab prior to bedtime, prn.  If still symptomatic or if still not sleeping well and well-tolerated can increase by half tablet (half milligram increments) up to 2 tablets prior to bedtime, as needed. 45 tablet 2     meloxicam (MOBIC) 7.5 MG tablet Take 1/4 tab twice per day 60 tablet 2     metoclopramide (REGLAN) 10 MG tablet        venlafaxine (EFFEXOR-ER) 75 MG 24 hr tablet Take 75 mg by mouth daily           PHYSICAL EXAM     Vital signs  /67 (BP Location: Right arm, Patient Position: Sitting)   Pulse 69   Ht 1.549 m (5' 1\")   Wt 59.4 kg (131 lb)   BMI 24.75 kg/m      Weight:   131 lbs 0 oz    Patient is alert and oriented x3 vital signs are reviewed and are documented in electronic medical record. Neck supple no bruits noted chest clear neurologically speech mentation and affect was normal. Cranial nerves II through XII are intact motor strength 5/5 reflexes 2+ toes downgoing. Sensation intact. Minimal dysmetria on finger-nose testing gait testing normal she is able to walk on her toes and heels without any difficulty. Romberg negative.      PERTINENT DIAGNOSTIC STUDIES     Following studies were reviewed:     MRI CERVICAL SPINE " 4/29/2022  1.  Normal cervical spinal cord. No pathologic enhancement.  2.  Degenerative changes most pronounced at C5-C6 and C6-C7.  3.  At C6-C7, there is mild spinal canal stenosis with moderate to  severe left and mild/moderate right foraminal narrowing. Correlate for  any C7 symptoms.  4.  At C5-C6, there is mild spinal canal and right foraminal  narrowing.     MRI THORACIC SPINE 4/29/2022  Normal thoracic spine MRI.      MRI BRAIN 11/11/2021  1.  No acute intracranial process.  2.  A couple foci of T2/FLAIR hyperintensity within the cerebral white matter may reflect minor sequela of previous microvascular ischemic or inflammatory insults and have also been described in the setting of chronic migraine headaches.     MRI LUMBAR SPINE 3/29/2022  1. Mild disc degeneration at L5-S1 and facet arthropathy at L4-5. No canal or foraminal stenosis.   2. The remaining levels appear normal.    EMG 6/16/2022  This is a normal nerve conduction and EMG study of right upper and lower extremities.    EEG 6/20/2022  This is a normal awake and drowsy EEG.  Please note that the absence of epileptiform abnormalities on EEG does not rule out the possibility of seizures.     PERTINENT LABS  Following labs were reviewed:  Hospital Outpatient Visit on 04/20/2022   Component Date Value     LVEF  04/20/2022 55-60%    Lab on 04/18/2022   Component Date Value     CK 04/18/2022 54      Folic Acid 04/18/2022 3.6      Methylmalonic Acid 04/18/2022 0.25      Vitamin B1 Whole Blood L* 04/18/2022 96      Vitamin B12 04/18/2022 497      Vitamin B6 04/18/2022 39.3      TSH 04/18/2022 1.10      Treponema Antibody Total 04/18/2022 Nonreactive          Total time spent for face to face visit, reviewing labs/imaging studies, counseling and coordination of care was: 30 Minutes spent on the date of the encounter doing chart review, review of outside records, review of test results, interpretation of tests, patient visit and documentation       This  note was dictated using voice recognition software.  Any grammatical or context distortions are unintentional and inherent to the software.    Orders Placed This Encounter   Procedures     EEG Ambulatory Unmonitored 12-26 hrs      New Prescriptions    No medications on file     Modified Medications    No medications on file                     Again, thank you for allowing me to participate in the care of your patient.        Sincerely,        Alex Zhang MD

## 2022-06-20 NOTE — NURSING NOTE
Chief Complaint   Patient presents with     Seizure-like activity     Discuss EEG results      Azalea Hall CMA on 6/20/2022 at 9:03 AM

## 2022-06-30 ENCOUNTER — ANCILLARY PROCEDURE (OUTPATIENT)
Dept: NEUROLOGY | Facility: CLINIC | Age: 37
End: 2022-06-30
Attending: PSYCHIATRY & NEUROLOGY
Payer: COMMERCIAL

## 2022-06-30 DIAGNOSIS — R56.9 SEIZURE-LIKE ACTIVITY (H): ICD-10-CM

## 2022-06-30 PROCEDURE — 95708 EEG WO VID EA 12-26HR UNMNTR: CPT | Performed by: PSYCHIATRY & NEUROLOGY

## 2022-06-30 PROCEDURE — 95719 EEG PHYS/QHP EA INCR W/O VID: CPT | Performed by: PSYCHIATRY & NEUROLOGY

## 2022-10-03 ENCOUNTER — HEALTH MAINTENANCE LETTER (OUTPATIENT)
Age: 37
End: 2022-10-03

## 2022-12-14 ENCOUNTER — ANCILLARY PROCEDURE (OUTPATIENT)
Dept: MRI IMAGING | Facility: CLINIC | Age: 37
End: 2022-12-14
Attending: PSYCHIATRY & NEUROLOGY
Payer: COMMERCIAL

## 2022-12-14 ENCOUNTER — OFFICE VISIT (OUTPATIENT)
Dept: NEUROLOGY | Facility: CLINIC | Age: 37
End: 2022-12-14
Payer: COMMERCIAL

## 2022-12-14 VITALS — SYSTOLIC BLOOD PRESSURE: 115 MMHG | DIASTOLIC BLOOD PRESSURE: 71 MMHG | HEART RATE: 63 BPM

## 2022-12-14 DIAGNOSIS — R56.9 SEIZURE-LIKE ACTIVITY (H): Primary | ICD-10-CM

## 2022-12-14 DIAGNOSIS — G37.9 DEMYELINATING DISEASE (H): ICD-10-CM

## 2022-12-14 DIAGNOSIS — R56.9 SEIZURE-LIKE ACTIVITY (H): ICD-10-CM

## 2022-12-14 PROCEDURE — A9585 GADOBUTROL INJECTION: HCPCS | Performed by: RADIOLOGY

## 2022-12-14 PROCEDURE — 70553 MRI BRAIN STEM W/O & W/DYE: CPT | Mod: TC | Performed by: RADIOLOGY

## 2022-12-14 PROCEDURE — 99214 OFFICE O/P EST MOD 30 MIN: CPT | Performed by: PSYCHIATRY & NEUROLOGY

## 2022-12-14 RX ORDER — GADOBUTROL 604.72 MG/ML
6 INJECTION INTRAVENOUS ONCE
Status: COMPLETED | OUTPATIENT
Start: 2022-12-14 | End: 2022-12-14

## 2022-12-14 RX ADMIN — GADOBUTROL 6 ML: 604.72 INJECTION INTRAVENOUS at 15:50

## 2022-12-14 NOTE — PROGRESS NOTES
NEUROLOGY FOLLOW UP VISIT  NOTE       [facility] NEUROLOG[facility] [place]  [address] [place] [address]  Tel: [phone]  Fax: [phone]  [email or web address]     Leticia Lawson,  1985, MRN [id number]  PCP: Liya Chaudhry  Date: 2022      ASSESSMENT & PLAN     Visit Diagnosis  1. Seizure-like activity (H)  2. Demyelinating disease     Seizure-like activity  37-year-old female with history of recurrent depression, TORRI, OCD who was evaluated for intermittent left leg and left lower lip twitching.She had extensive work-up including lab work that included normal  normal RPR, TSH, B12, B1, B6, MMA, folate and CK.  Also, MRI brain, lumbar spine, thoracic spine, cervical spine and EEG were normal.  Additionally, EMG of lower extremities and 24-hour EEG was done that was normal.  Her symptoms resolved during summertime but in winter she has noticed recurrent symptoms.  She was also concerned about couple of T2 flair hyperintensity seen within the cerebral white matter and is worried about the possibility of demyelinating disease.  I reassured her that these are nonspecific but to ensure stability I have ordered MRI of the brain.  Follow-up will be in apparent basis    Thank you again for this referral, please feel free to contact me if you have any questions.    Alex Zhang MD  [facility]  (Former[facility], [facility]     HISTORY OF PRESENT ILLNESS     Patient is a pleasant 37-year-old female with history of recurrent depression, TORRI, OCD last seen on 2022 for intermittent left leg and lower lip twitching.  She had extensive work-up including normal RPR, TSH, B12, vitamin B1, B6, MMA, folate, CK.  Also imaging studies included normal MRI brain, complete spine and EEG.  She also had EMG of lower extremities & a 24-hour EEG that too were normal and I had informed her that no further intervention is needed from  neurology standpoint.  She reports that her symptoms had resolved during summertime and she was doing fine until recently when she started experiencing similar symptoms that she experienced before and attributes that to winter.  She is concerned about couple of white matter lesions noted on MRI scan and is wondering if she has a demyelinating disease    Patient was evaluated for shaking in the legs associated with twitching in her left lower lips these were not associated with any loss of consciousness.  There was a concern her symptoms could be due to her underlying mental health issues.     PROBLEM LIST   Patient Active Problem List   Diagnosis Code     Obsessive-compulsive disorder, unspecified type F42.9     Severe episode of recurrent major depressive disorder, without psychotic features (H) F33.2     TORRI (generalized anxiety disorder) F41.1     Thyroid nodule E04.1     ASCUS of cervix with negative high risk HPV R87.610     Positive FIONA (antinuclear antibody) R76.8         PAST MEDICAL & SURGICAL HISTORY     Past Medical History:   Patient  has a past medical history of Abnormal Pap smear of cervix (08/18/2021), Depressive disorder (2001), and Thyroid nodule.    Surgical History:  She  has a past surgical history that includes tubal ligation (04/19/2019); Egd; d & c (2010); Abdomen surgery (2019); and GI surgery (2011).     SOCIAL HISTORY     Reviewed, and she  reports that she quit smoking about 7 years ago. Her smoking use included vaping device. She started smoking about 21 years ago. She has never used smokeless tobacco. She reports current alcohol use. She reports that she does not use drugs.     FAMILY HISTORY     Reviewed, and family history includes Alzheimer Disease in her maternal grandmother; Anxiety Disorder in an other family member; Arthritis in her mother; Asthma in an other family member; Bipolar Disorder in her sister; Bone Cancer in her paternal grandmother; Breast Cancer in her paternal  grandmother; Depression in her mother and another family member; Emphysema in her paternal grandfather; Hyperlipidemia in her maternal grandmother and mother; Lung Cancer in her maternal grandfather; Mental Illness in her sister; Ovarian Cancer in her mother; Thyroid Disease in her mother.     ALLERGIES     Allergies   Allergen Reactions     Sulfa Drugs          REVIEW OF SYSTEMS     A 12 point review of system was performed and was negative except as outlined in the history of present illness.     HOME MEDICATIONS     Current Outpatient Rx   Medication Sig Dispense Refill     cyclobenzaprine (FLEXERIL) 5 MG tablet Take 1/2-1 tab prior to bedtime, prn.  If still symptomatic or if still not sleeping well and well-tolerated can increase by half tablet (half milligram increments) up to 2 tablets prior to bedtime, as needed. 45 tablet 2     meloxicam (MOBIC) 7.5 MG tablet Take 1/4 tab twice per day 60 tablet 2     metoclopramide (REGLAN) 10 MG tablet        sertraline (ZOLOFT) 50 MG tablet Take 50 mg by mouth daily as directed       venlafaxine (EFFEXOR-ER) 75 MG 24 hr tablet Take 75 mg by mouth daily (Patient not taking: Reported on 12/14/2022)           PHYSICAL EXAM     Vital signs  /71   Pulse 63     Weight:   0 lbs 0 oz    Patient is alert and oriented x3 vital signs are reviewed and are documented in electronic medical record. Neck supple no bruits noted chest clear neurologically speech mentation and affect was normal. Cranial nerves II through XII are intact motor strength 5/5 reflexes 2+ toes downgoing. Sensation intact. Minimal dysmetria on finger-nose testing gait testing normal she is able to walk on her toes and heels without any difficulty. Romberg negative.      PERTINENT DIAGNOSTIC STUDIES     Following studies were reviewed:     MRI CERVICAL SPINE 4/29/2022  1.  Normal cervical spinal cord. No pathologic enhancement.  2.  Degenerative changes most pronounced at C5-C6 and C6-C7.  3.  At C6-C7,  there is mild spinal canal stenosis with moderate to  severe left and mild/moderate right foraminal narrowing. Correlate for  any C7 symptoms.  4.  At C5-C6, there is mild spinal canal and right foraminal  narrowing.     MRI THORACIC SPINE 4/29/2022  Normal thoracic spine MRI.      MRI BRAIN 11/11/2021  1.  No acute intracranial process.  2.  A couple foci of T2/FLAIR hyperintensity within the cerebral white matter may reflect minor sequela of previous microvascular ischemic or inflammatory insults and have also been described in the setting of chronic migraine headaches.     MRI LUMBAR SPINE 3/29/2022  1. Mild disc degeneration at L5-S1 and facet arthropathy at L4-5. No canal or foraminal stenosis.   2. The remaining levels appear normal.     EMG 6/16/2022  This is a normal nerve conduction and EMG study of right upper and lower extremities.     EEG 6/20/2022  This is a normal awake and drowsy EEG.  Please note that the absence of epileptiform abnormalities on EEG does not rule out the possibility of seizures     PERTINENT LABS  Following labs were reviewed:  No visits with results within 3 Month(s) from this visit.   Latest known visit with results is:   Hospital Outpatient Visit on 04/20/2022   Component Date Value     LVEF  04/20/2022 55-60%          Total time spent for face to face visit, reviewing labs/imaging studies, counseling and coordination of care was: 30 Minutes spent on the date of the encounter doing chart review, review of outside records, review of test results, interpretation of tests, patient visit and documentation       This note was dictated using voice recognition software.  Any grammatical or context distortions are unintentional and inherent to the software.    No orders of the defined types were placed in this encounter.     New Prescriptions    No medications on file     Modified Medications    No medications on file

## 2022-12-14 NOTE — NURSING NOTE
"Leticia Lawson is a 37 year old female who presents for:  Chief Complaint   Patient presents with     Neurologic Problem     Seizure like activity         Initial Vitals:  /71   Pulse 63  Estimated body mass index is 24.75 kg/m  as calculated from the following:    Height as of 6/20/22: 1.549 m (5' 1\").    Weight as of 6/20/22: 59.4 kg (131 lb).. There is no height or weight on file to calculate BSA. BP completed using cuff size: wrist cuff    Misael Grace  "

## 2022-12-14 NOTE — LETTER
2022         RE: Leticia Lawson  [address]  [place] [address]        Dear Colleague,    Thank you for referring your patient, Leticia Lawson, to the [facility] [place]. Please see a copy of my visit note below.    NEUROLOGY FOLLOW UP VISIT  NOTE       [facility]UR[facility] [place]  [address] [place] [address]  Tel: [phone]  Fax: [phone]  [email or web address]     Leticia Lawson,  1985, MRN [id number]  PCP: Liya Chaudhry  Date: 2022      ASSESSMENT & PLAN     Visit Diagnosis  1. Seizure-like activity (H)  2. Demyelinating disease     Seizure-like activity  37-year-old female with history of recurrent depression, TORRI, OCD who was evaluated for intermittent left leg and left lower lip twitching.She had extensive work-up including lab work that included normal  normal RPR, TSH, B12, B1, B6, MMA, folate and CK.  Also, MRI brain, lumbar spine, thoracic spine, cervical spine and EEG were normal.  Additionally, EMG of lower extremities and 24-hour EEG was done that was normal.  Her symptoms resolved during summertime but in winter she has noticed recurrent symptoms.  She was also concerned about couple of T2 flair hyperintensity seen within the cerebral white matter and is worried about the possibility of demyelinating disease.  I reassured her that these are nonspecific but to ensure stability I have ordered MRI of the brain.  Follow-up will be in apparent basis    Thank you again for this referral, please feel free to contact me if you have any questions.    Alex Zhang MD  [facility]  ([facility], [facility]     HISTORY OF PRESENT ILLNESS     Patient is a pleasant 37-year-old female with history of recurrent depression, TORRI, OCD last seen on 2022 for intermittent left leg and lower lip twitching.  She had extensive work-up including normal RPR, TSH,  B12, vitamin B1, B6, MMA, folate, CK.  Also imaging studies included normal MRI brain, complete spine and EEG.  She also had EMG of lower extremities & a 24-hour EEG that too were normal and I had informed her that no further intervention is needed from neurology standpoint.  She reports that her symptoms had resolved during summertime and she was doing fine until recently when she started experiencing similar symptoms that she experienced before and attributes that to winter.  She is concerned about couple of white matter lesions noted on MRI scan and is wondering if she has a demyelinating disease    Patient was evaluated for shaking in the legs associated with twitching in her left lower lips these were not associated with any loss of consciousness.  There was a concern her symptoms could be due to her underlying mental health issues.     PROBLEM LIST   Patient Active Problem List   Diagnosis Code     Obsessive-compulsive disorder, unspecified type F42.9     Severe episode of recurrent major depressive disorder, without psychotic features (H) F33.2     TORRI (generalized anxiety disorder) F41.1     Thyroid nodule E04.1     ASCUS of cervix with negative high risk HPV R87.610     Positive FIONA (antinuclear antibody) R76.8         PAST MEDICAL & SURGICAL HISTORY     Past Medical History:   Patient  has a past medical history of Abnormal Pap smear of cervix (08/18/2021), Depressive disorder (2001), and Thyroid nodule.    Surgical History:  She  has a past surgical history that includes tubal ligation (04/19/2019); Egd; d & c (2010); Abdomen surgery (2019); and GI surgery (2011).     SOCIAL HISTORY     Reviewed, and she  reports that she quit smoking about 7 years ago. Her smoking use included vaping device. She started smoking about 21 years ago. She has never used smokeless tobacco. She reports current alcohol use. She reports that she does not use drugs.     FAMILY HISTORY     Reviewed, and family history includes  Alzheimer Disease in her maternal grandmother; Anxiety Disorder in an other family member; Arthritis in her mother; Asthma in an other family member; Bipolar Disorder in her sister; Bone Cancer in her paternal grandmother; Breast Cancer in her paternal grandmother; Depression in her mother and another family member; Emphysema in her paternal grandfather; Hyperlipidemia in her maternal grandmother and mother; Lung Cancer in her maternal grandfather; Mental Illness in her sister; Ovarian Cancer in her mother; Thyroid Disease in her mother.     ALLERGIES     Allergies   Allergen Reactions     Sulfa Drugs          REVIEW OF SYSTEMS     A 12 point review of system was performed and was negative except as outlined in the history of present illness.     HOME MEDICATIONS     Current Outpatient Rx   Medication Sig Dispense Refill     cyclobenzaprine (FLEXERIL) 5 MG tablet Take 1/2-1 tab prior to bedtime, prn.  If still symptomatic or if still not sleeping well and well-tolerated can increase by half tablet (half milligram increments) up to 2 tablets prior to bedtime, as needed. 45 tablet 2     meloxicam (MOBIC) 7.5 MG tablet Take 1/4 tab twice per day 60 tablet 2     metoclopramide (REGLAN) 10 MG tablet        sertraline (ZOLOFT) 50 MG tablet Take 50 mg by mouth daily as directed       venlafaxine (EFFEXOR-ER) 75 MG 24 hr tablet Take 75 mg by mouth daily (Patient not taking: Reported on 12/14/2022)           PHYSICAL EXAM     Vital signs  /71   Pulse 63     Weight:   0 lbs 0 oz    Patient is alert and oriented x3 vital signs are reviewed and are documented in electronic medical record. Neck supple no bruits noted chest clear neurologically speech mentation and affect was normal. Cranial nerves II through XII are intact motor strength 5/5 reflexes 2+ toes downgoing. Sensation intact. Minimal dysmetria on finger-nose testing gait testing normal she is able to walk on her toes and heels without any difficulty. Romberg  negative.      PERTINENT DIAGNOSTIC STUDIES     Following studies were reviewed:     MRI CERVICAL SPINE 4/29/2022  1.  Normal cervical spinal cord. No pathologic enhancement.  2.  Degenerative changes most pronounced at C5-C6 and C6-C7.  3.  At C6-C7, there is mild spinal canal stenosis with moderate to  severe left and mild/moderate right foraminal narrowing. Correlate for  any C7 symptoms.  4.  At C5-C6, there is mild spinal canal and right foraminal  narrowing.     MRI THORACIC SPINE 4/29/2022  Normal thoracic spine MRI.      MRI BRAIN 11/11/2021  1.  No acute intracranial process.  2.  A couple foci of T2/FLAIR hyperintensity within the cerebral white matter may reflect minor sequela of previous microvascular ischemic or inflammatory insults and have also been described in the setting of chronic migraine headaches.     MRI LUMBAR SPINE 3/29/2022  1. Mild disc degeneration at L5-S1 and facet arthropathy at L4-5. No canal or foraminal stenosis.   2. The remaining levels appear normal.     EMG 6/16/2022  This is a normal nerve conduction and EMG study of right upper and lower extremities.     EEG 6/20/2022  This is a normal awake and drowsy EEG.  Please note that the absence of epileptiform abnormalities on EEG does not rule out the possibility of seizures     PERTINENT LABS  Following labs were reviewed:  No visits with results within 3 Month(s) from this visit.   Latest known visit with results is:   Hospital Outpatient Visit on 04/20/2022   Component Date Value     LVEF  04/20/2022 55-60%          Total time spent for face to face visit, reviewing labs/imaging studies, counseling and coordination of care was: 30 Minutes spent on the date of the encounter doing chart review, review of outside records, review of test results, interpretation of tests, patient visit and documentation       This note was dictated using voice recognition software.  Any grammatical or context distortions are unintentional and inherent  to the software.    No orders of the defined types were placed in this encounter.     New Prescriptions    No medications on file     Modified Medications    No medications on file                     Again, thank you for allowing me to participate in the care of your patient.        Sincerely,        Alex Zhang MD

## 2022-12-15 NOTE — RESULT ENCOUNTER NOTE
Dear Sheree,   MRI brain is normal and couple of nonspecific areas of signal hyperintensity are unchanged compared to previous MRI done on 11/11/2021.  No further intervention needed from neurology standpoint.  Follow-up as needed    Alex Zhang MD

## 2022-12-26 NOTE — PROGRESS NOTES
SUBJECTIVE:   CC: Sheree is an 37 year old who presents for preventive health visit.   Patient has been advised of split billing requirements and indicates understanding: Yes  Healthy Habits:     Getting at least 3 servings of Calcium per day:  NO    Bi-annual eye exam:  Yes    Dental care twice a year:  NO    Sleep apnea or symptoms of sleep apnea:  Daytime drowsiness    Diet:  Regular (no restrictions)    Frequency of exercise:  None    Taking medications regularly:  Yes    Medication side effects:  Lightheadedness    PHQ-2 Total Score: 1    Additional concerns today:  No        Today's PHQ-2 Score:   PHQ-2 (  [employer]) 2022   Q1: Little interest or pleasure in doing things 1   Q2: Feeling down, depressed or hopeless 0   PHQ-2 Score 1   PHQ-2 Total Score (12-17 Years)- Positive if 3 or more points; Administer PHQ-A if positive -   Q1: Little interest or pleasure in doing things Several days   Q2: Feeling down, depressed or hopeless Not at all   PHQ-2 Score 1       Have you ever done Advance Care Planning? (For example, a Health Directive, POLST, or a discussion with a medical provider or your loved ones about your wishes): No, advance care planning information given to patient to review.  Patient declined advance care planning discussion at this time.    Social History     Tobacco Use     Smoking status: Former     Packs/day: 0.00     Years: 0.00     Pack years: 0.00     Types: Vaping Device, Cigarettes     Start date: 2001     Quit date: 2015     Years since quittin.9     Smokeless tobacco: Never   Substance Use Topics     Alcohol use: Yes     Comment: 4x per week       Alcohol Use 2022   Prescreen: >3 drinks/day or >7 drinks/week? No   Prescreen: >3 drinks/day or >7 drinks/week? -       Reviewed orders with patient.  Reviewed health maintenance and updated orders accordingly - Yes  BP Readings from Last 3 Encounters:   22 118/68   22 115/71   22 113/67    Wt Readings  from Last 3 Encounters:   12/28/22 61.2 kg (135 lb)   06/20/22 59.4 kg (131 lb)   06/16/22 59.4 kg (131 lb)              Breast Cancer Screening:  Any new diagnosis of family breast, ovarian, or bowel cancer? No    FHS-7:   Breast CA Risk Assessment (FHS-7) 12/28/2022   Did any of your first-degree relatives have breast or ovarian cancer? Yes   Did any of your relatives have bilateral breast cancer? Yes   Did any man in your family have breast cancer? No   Did any woman in your family have breast and ovarian cancer? Yes   Did any woman in your family have breast cancer before age 50 y? No   Do you have 2 or more relatives with breast and/or ovarian cancer? Yes   Do you have 2 or more relatives with breast and/or bowel cancer? No       Pertinent mammograms are reviewed under the imaging tab.    History of abnormal Pap smear: YES - updated in Problem List and Health Maintenance accordingly  PAP / HPV Latest Ref Rng & Units 8/18/2021   PAP   Atypical squamous cells of undetermined significance (ASC-US)(A)   HPV16 Negative Negative   HPV18 Negative Negative   HRHPV Negative Negative     Reviewed and updated as needed this visit by clinical staff   Tobacco  Allergies  Meds           Stephany Cruz CMA    Reviewed and updated as needed this visit by Provider                    Here today for physical and medication check.  Is not fasting for labs.    Completed appointments with all specialist.  Ironically, over the summer all joint pains resolved.  Unfortunately, about 6 weeks ago when the really cold weather arrived pain started to return.  Has Pain to right knee hurts all day everyday. Weakness and loss of coordination on stairs.  Also has right arm pain that goes to elbow.  Feels like pain came right back where he left off. No injury to right knee that is aware of. Will take aleve as needed. Meloxicam causes to sleep so does flexaril. Uses a heating pad to lower back as needed. Elevates leg and tries to change  positions frequently.     Had tubes tied in 2018.  Would like to have tubal ligation reversed.    Follows with psychiatry.  Thought that zoloft was causing arthralgias and myalgias so tried a couple different medications Effexor and fluoxetine. Caused increased and worsened depression. Was going to couseling and didn ot feel like it was benificial.  Eventually put back on Zoloft.  Feels like depression is well controlled but still continues to feel very anxious.  Sees mental health monthly.    Mother works for chiropractor. Did POTS test with chiro and it was positive. Drinking water and taking salt pills.  Since starting to take the salt pills feels like dizziness and fast heart rate when rises is much less.    Does feel some short of breath that is getting worse with activity. Feels like is lasting longer. No wheezing. Does feel short of breath at times when is standing cooking or dishes. Does not feel short of breath with sitting. Smoked age [age]. Vapes now all day-constantly due to anxiety. Stired to cut back and then picked at fingers.  Had echocardiogram April 2022 that was normal.    Has lower abdominal pain and pelvic pain. Bowels move once per week.  Does not usually have to push or strain to have bowel movement.  Drinks 1-5 bottles of water per day.  Does not really like fruit.  Eats a lot of vegetables.  Did take 1 dose of miralax.  Noticed that really had to push and strain to have bowel movement after taking MiraLAX    Last Pap: 8/21-ASCUS, neg HPV. Repeat in 3 years. 9/2018-normal.  Did have abnormal in 20s. Had colposcopy that was normal. Then had 2 more paps that were abnormal, since those have all been normal since.   Last mammogram: Never. [facility]M with breast and ovarian cancer. PGM with breast cancer. Mother with BRCA testing that was negative. Mother with ovarian cancer found during hyst. No chemo or radiation needed.   Last BMD: N/A  Last Colonoscopy: Never-No family history of colon cancer  Last  "eye exam: Yearly  Last dental exam: 2020  Last tetanus vaccine: Done 2019  Last influenza vaccine: Plans to get in the fall  Last shingles vaccine: N/A  Last pneumonia vaccine: N/A  Last COVID vaccine: Has had both doses.   Hep C screen: Has been in the past and was negative  HIV screen: Was checked with pregnancies and was negative.   AAA screen (age 65-78 with smoking hx): N/A  IVD (HTN, Hyperlipid, Smoking): N/A  Lung CA screening (55-80, 30 pk smoking hx): N/A    Review of Systems   Constitutional: Negative for chills and fever.   HENT: Positive for congestion and hearing loss. Negative for ear pain and sore throat.    Eyes: Positive for visual disturbance. Negative for pain.   Respiratory: Positive for shortness of breath. Negative for cough.    Cardiovascular: Positive for chest pain, palpitations and peripheral edema.   Gastrointestinal: Positive for abdominal pain and constipation. Negative for diarrhea, heartburn, hematochezia and nausea.   Breasts:  Negative for tenderness, breast mass and discharge.   Genitourinary: Positive for pelvic pain. Negative for dysuria, frequency, genital sores, hematuria, urgency, vaginal bleeding and vaginal discharge.   Musculoskeletal: Positive for arthralgias (right knee has the most of amount of pain. , right arm pain). Negative for joint swelling and myalgias.   Skin: Negative for rash.   Neurological: Positive for weakness. Negative for dizziness, headaches and paresthesias.   Psychiatric/Behavioral: Negative for mood changes. The patient is not nervous/anxious.         OBJECTIVE:   /68   Pulse 82   Temp 99  F (37.2  C) (Tympanic)   Resp 13   Ht 1.549 m (5' 1\")   Wt 61.2 kg (135 lb)   LMP  (LMP Unknown)   SpO2 100%   BMI 25.51 kg/m    Physical Exam  Constitutional:       Appearance: Normal appearance. She is well-developed.   HENT:      Head: Normocephalic and atraumatic.      Right Ear: Tympanic membrane and external ear normal. No middle ear effusion.     "  Left Ear: Tympanic membrane and external ear normal.  No middle ear effusion.      Nose: No mucosal edema.      Mouth/Throat:      Pharynx: Uvula midline.   Eyes:      Pupils: Pupils are equal, round, and reactive to light.   Neck:      Thyroid: No thyromegaly.      Vascular: No carotid bruit.   Cardiovascular:      Rate and Rhythm: Normal rate and regular rhythm.      Pulses:           Femoral pulses are 2+ on the right side and 2+ on the left side.     Heart sounds: Normal heart sounds.   Pulmonary:      Effort: Pulmonary effort is normal.      Breath sounds: Normal breath sounds.   Abdominal:      General: Bowel sounds are normal.      Palpations: Abdomen is soft.      Tenderness: There is abdominal tenderness in the left upper quadrant and left lower quadrant.   Musculoskeletal:         General: Normal range of motion.      Cervical back: Normal range of motion.      Right knee: No swelling or deformity. Normal range of motion. Tenderness present.   Skin:     General: Skin is warm and dry.      Findings: No rash.   Neurological:      Mental Status: She is alert.   Psychiatric:         Behavior: Behavior normal.         ASSESSMENT/PLAN:   Routine general medical examination at a health care facility  Screening guidelines reviewed.   - Lipid panel reflex to direct LDL Fasting; Future  - Glucose; Future  - Lipid panel reflex to direct LDL Fasting  - Glucose    Multiple joint pain  Encouraged to follow-up with rheumatology.  We will check labs again here today  - CRP inflammation; Future  - Erythrocyte sedimentation rate auto; Future  - CRP inflammation  - Erythrocyte sedimentation rate auto    H/O tubal ligation  - Ob/Gyn Referral; Future    SOB (shortness of breath)  Previous cardiology notes reviewed.  Previous echo in normal limits.  Encouraged to stop using vape.  Will obtain pulmonary function test.  - General PFT Lab (Please always keep checked); Future    Constipation, unspecified constipation type  Enc to  drink plenty of water  Allow adequate time for BM's  Eat high fiber foods  MiraLAX every other day    TORRI (generalized anxiety disorder)  Continue to follow with psychiatry.  Encouraged to discuss increased anxiety with psychiatrist.  Consider BuSpar.    Patient has been advised of split billing requirements and indicates understanding: Yes      COUNSELING:  Reviewed preventive health counseling, as reflected in patient instructions       Regular exercise       Healthy diet/nutrition       Vision screening       Immunizations    Vaccinated for: Covid-19 and Influenza             Family planning       Colorectal Cancer Screening       Consider Hep C screening for all patients one time for ages 18-79 years       HIV screeninx in teen years, 1x in adult years, and at intervals if high risk        She reports that she quit smoking about 7 years ago. Her smoking use included vaping device. She started smoking about 22 years ago. She has never used smokeless tobacco.    IVANIA Dong CNP  [facility]

## 2022-12-26 NOTE — PATIENT INSTRUCTIONS
Please make an appointment to see the dentist.     Talk to your mental health provider about buspar for anxiety.     Please send your rheumatologist a message about your pain.     Take MiraLAX every other day.  Try and drink 3-5 bottles of water per day.  Increase your fiber intake.      Preventive Health Recommendations  Female Ages 26 - 39  Yearly exam:   See your health care provider every year in order to  Review health changes.   Discuss preventive care.    Review your medicines if you your doctor has prescribed any.    Until age 30: Get a Pap test every three years (more often if you have had an abnormal result).    After age 30: Talk to your doctor about whether you should have a Pap test every 3 years or have a Pap test with HPV screening every 5 years.   You do not need a Pap test if your uterus was removed (hysterectomy) and you have not had cancer.  You should be tested each year for STDs (sexually transmitted diseases), if you're at risk.   Talk to your provider about how often to have your cholesterol checked.  If you are at risk for diabetes, you should have a diabetes test (fasting glucose).  Shots: Get a flu shot each year. Get a tetanus shot every 10 years.   Nutrition:   Eat at least 5 servings of fruits and vegetables each day.  Eat whole-grain bread, whole-wheat pasta and brown rice instead of white grains and rice.  Get adequate Calcium and Vitamin D.     Lifestyle  Exercise at least 150 minutes a week (30 minutes a day, 5 days of the week). This will help you control your weight and prevent disease.  Limit alcohol to one drink per day.  No smoking.   Wear sunscreen to prevent skin cancer.  See your dentist every six months for an exam and cleaning.

## 2022-12-28 ENCOUNTER — OFFICE VISIT (OUTPATIENT)
Dept: FAMILY MEDICINE | Facility: CLINIC | Age: 37
End: 2022-12-28
Payer: COMMERCIAL

## 2022-12-28 VITALS
SYSTOLIC BLOOD PRESSURE: 118 MMHG | WEIGHT: 135 LBS | TEMPERATURE: 99 F | BODY MASS INDEX: 25.49 KG/M2 | OXYGEN SATURATION: 100 % | RESPIRATION RATE: 13 BRPM | HEIGHT: 61 IN | DIASTOLIC BLOOD PRESSURE: 68 MMHG | HEART RATE: 82 BPM

## 2022-12-28 DIAGNOSIS — Z00.00 ROUTINE GENERAL MEDICAL EXAMINATION AT A HEALTH CARE FACILITY: Primary | ICD-10-CM

## 2022-12-28 DIAGNOSIS — R06.02 SOB (SHORTNESS OF BREATH): ICD-10-CM

## 2022-12-28 DIAGNOSIS — F41.1 GAD (GENERALIZED ANXIETY DISORDER): Chronic | ICD-10-CM

## 2022-12-28 DIAGNOSIS — M25.50 MULTIPLE JOINT PAIN: ICD-10-CM

## 2022-12-28 DIAGNOSIS — K59.00 CONSTIPATION, UNSPECIFIED CONSTIPATION TYPE: ICD-10-CM

## 2022-12-28 DIAGNOSIS — Z98.51 H/O TUBAL LIGATION: ICD-10-CM

## 2022-12-28 LAB — ERYTHROCYTE [SEDIMENTATION RATE] IN BLOOD BY WESTERGREN METHOD: 6 MM/HR (ref 0–20)

## 2022-12-28 PROCEDURE — 90686 IIV4 VACC NO PRSV 0.5 ML IM: CPT | Performed by: NURSE PRACTITIONER

## 2022-12-28 PROCEDURE — 0124A COVID-19 VACCINE BIVALENT BOOSTER 12+ (PFIZER): CPT | Performed by: NURSE PRACTITIONER

## 2022-12-28 PROCEDURE — 82947 ASSAY GLUCOSE BLOOD QUANT: CPT | Performed by: NURSE PRACTITIONER

## 2022-12-28 PROCEDURE — 86140 C-REACTIVE PROTEIN: CPT | Performed by: NURSE PRACTITIONER

## 2022-12-28 PROCEDURE — 80061 LIPID PANEL: CPT | Performed by: NURSE PRACTITIONER

## 2022-12-28 PROCEDURE — 36415 COLL VENOUS BLD VENIPUNCTURE: CPT | Performed by: NURSE PRACTITIONER

## 2022-12-28 PROCEDURE — 99214 OFFICE O/P EST MOD 30 MIN: CPT | Mod: 25 | Performed by: NURSE PRACTITIONER

## 2022-12-28 PROCEDURE — 85652 RBC SED RATE AUTOMATED: CPT | Performed by: NURSE PRACTITIONER

## 2022-12-28 PROCEDURE — 99395 PREV VISIT EST AGE 18-39: CPT | Mod: 25 | Performed by: NURSE PRACTITIONER

## 2022-12-28 PROCEDURE — 90471 IMMUNIZATION ADMIN: CPT | Performed by: NURSE PRACTITIONER

## 2022-12-28 PROCEDURE — 91312 COVID-19 VACCINE BIVALENT BOOSTER 12+ (PFIZER): CPT | Performed by: NURSE PRACTITIONER

## 2022-12-28 ASSESSMENT — ANXIETY QUESTIONNAIRES
4. TROUBLE RELAXING: NOT AT ALL
7. FEELING AFRAID AS IF SOMETHING AWFUL MIGHT HAPPEN: NOT AT ALL
6. BECOMING EASILY ANNOYED OR IRRITABLE: MORE THAN HALF THE DAYS
3. WORRYING TOO MUCH ABOUT DIFFERENT THINGS: NOT AT ALL
GAD7 TOTAL SCORE: 3
IF YOU CHECKED OFF ANY PROBLEMS ON THIS QUESTIONNAIRE, HOW DIFFICULT HAVE THESE PROBLEMS MADE IT FOR YOU TO DO YOUR WORK, TAKE CARE OF THINGS AT HOME, OR GET ALONG WITH OTHER PEOPLE: SOMEWHAT DIFFICULT
8. IF YOU CHECKED OFF ANY PROBLEMS, HOW DIFFICULT HAVE THESE MADE IT FOR YOU TO DO YOUR WORK, TAKE CARE OF THINGS AT HOME, OR GET ALONG WITH OTHER PEOPLE?: SOMEWHAT DIFFICULT
GAD7 TOTAL SCORE: 3
2. NOT BEING ABLE TO STOP OR CONTROL WORRYING: NOT AT ALL
5. BEING SO RESTLESS THAT IT IS HARD TO SIT STILL: NOT AT ALL
1. FEELING NERVOUS, ANXIOUS, OR ON EDGE: SEVERAL DAYS
GAD7 TOTAL SCORE: 3
7. FEELING AFRAID AS IF SOMETHING AWFUL MIGHT HAPPEN: NOT AT ALL

## 2022-12-28 ASSESSMENT — ENCOUNTER SYMPTOMS
DIARRHEA: 0
NERVOUS/ANXIOUS: 0
HEARTBURN: 0
ABDOMINAL PAIN: 1
PARESTHESIAS: 0
NAUSEA: 0
HEMATOCHEZIA: 0
CONSTIPATION: 1
WEAKNESS: 1
ARTHRALGIAS: 1
MYALGIAS: 0
SORE THROAT: 0
BREAST MASS: 0
FEVER: 0
HEADACHES: 0
EYE PAIN: 0
JOINT SWELLING: 0
FREQUENCY: 0
PALPITATIONS: 1
CHILLS: 0
DIZZINESS: 0
HEMATURIA: 0
SHORTNESS OF BREATH: 1
COUGH: 0
DYSURIA: 0

## 2022-12-28 ASSESSMENT — PATIENT HEALTH QUESTIONNAIRE - PHQ9
10. IF YOU CHECKED OFF ANY PROBLEMS, HOW DIFFICULT HAVE THESE PROBLEMS MADE IT FOR YOU TO DO YOUR WORK, TAKE CARE OF THINGS AT HOME, OR GET ALONG WITH OTHER PEOPLE: SOMEWHAT DIFFICULT
SUM OF ALL RESPONSES TO PHQ QUESTIONS 1-9: 9
SUM OF ALL RESPONSES TO PHQ QUESTIONS 1-9: 9

## 2022-12-28 ASSESSMENT — PAIN SCALES - GENERAL: PAINLEVEL: NO PAIN (0)

## 2022-12-29 LAB
CHOLEST SERPL-MCNC: 257 MG/DL
CRP SERPL-MCNC: <2.9 MG/L (ref 0–8)
FASTING STATUS PATIENT QL REPORTED: YES
FASTING STATUS PATIENT QL REPORTED: YES
GLUCOSE BLD-MCNC: 93 MG/DL (ref 70–99)
HDLC SERPL-MCNC: 58 MG/DL
LDLC SERPL CALC-MCNC: 173 MG/DL
NONHDLC SERPL-MCNC: 199 MG/DL
TRIGL SERPL-MCNC: 129 MG/DL

## 2023-01-08 DIAGNOSIS — R06.02 SOB (SHORTNESS OF BREATH): Primary | ICD-10-CM

## 2023-11-28 ENCOUNTER — PATIENT OUTREACH (OUTPATIENT)
Dept: CARE COORDINATION | Facility: CLINIC | Age: 38
End: 2023-11-28
Payer: COMMERCIAL

## 2023-12-12 ENCOUNTER — PATIENT OUTREACH (OUTPATIENT)
Dept: CARE COORDINATION | Facility: CLINIC | Age: 38
End: 2023-12-12
Payer: COMMERCIAL

## 2024-03-09 ENCOUNTER — HEALTH MAINTENANCE LETTER (OUTPATIENT)
Age: 39
End: 2024-03-09

## 2025-03-16 ENCOUNTER — HEALTH MAINTENANCE LETTER (OUTPATIENT)
Age: 40
End: 2025-03-16